# Patient Record
Sex: MALE | Race: WHITE | NOT HISPANIC OR LATINO | Employment: FULL TIME | URBAN - METROPOLITAN AREA
[De-identification: names, ages, dates, MRNs, and addresses within clinical notes are randomized per-mention and may not be internally consistent; named-entity substitution may affect disease eponyms.]

---

## 2017-01-04 ENCOUNTER — ALLSCRIPTS OFFICE VISIT (OUTPATIENT)
Dept: WOUND CARE | Facility: HOSPITAL | Age: 57
End: 2017-01-04
Attending: PODIATRIST
Payer: COMMERCIAL

## 2017-01-04 PROCEDURE — 11042 DBRDMT SUBQ TIS 1ST 20SQCM/<: CPT | Performed by: PODIATRIST

## 2017-01-18 ENCOUNTER — ALLSCRIPTS OFFICE VISIT (OUTPATIENT)
Dept: WOUND CARE | Facility: HOSPITAL | Age: 57
End: 2017-01-18
Attending: PODIATRIST
Payer: COMMERCIAL

## 2017-01-18 PROCEDURE — 99212 OFFICE O/P EST SF 10 MIN: CPT | Performed by: PODIATRIST

## 2017-02-14 ENCOUNTER — GENERIC CONVERSION - ENCOUNTER (OUTPATIENT)
Dept: OTHER | Facility: OTHER | Age: 57
End: 2017-02-14

## 2017-04-10 ENCOUNTER — ALLSCRIPTS OFFICE VISIT (OUTPATIENT)
Dept: OTHER | Facility: OTHER | Age: 57
End: 2017-04-10

## 2017-08-01 ENCOUNTER — GENERIC CONVERSION - ENCOUNTER (OUTPATIENT)
Dept: OTHER | Facility: OTHER | Age: 57
End: 2017-08-01

## 2017-08-15 ENCOUNTER — ALLSCRIPTS OFFICE VISIT (OUTPATIENT)
Dept: OTHER | Facility: OTHER | Age: 57
End: 2017-08-15

## 2017-10-10 DIAGNOSIS — Z94.0 HISTORY OF KIDNEY TRANSPLANT: ICD-10-CM

## 2017-10-10 DIAGNOSIS — N28.9 DISORDER OF KIDNEY AND URETER: ICD-10-CM

## 2017-10-25 ENCOUNTER — ALLSCRIPTS OFFICE VISIT (OUTPATIENT)
Dept: OTHER | Facility: OTHER | Age: 57
End: 2017-10-25

## 2017-10-26 NOTE — PROGRESS NOTES
Assessment  1  Hypertension (401 9) (I10)   2  -donor kidney transplant recipient (V42 0) (Z94 0)   3  Diabetes mellitus (250 00) (E11 9)   4  Immunosuppression (279 9) (D89 9)    Plan  -donor kidney transplant recipient    · (1) CBC/ PLT (NO DIFF); Status:Active; Requested for:2018; Perform:[facility]; [place]; For:-donor kidney transplant recipient; Ordered By:Talon Castro;   · (1) COMPREHENSIVE METABOLIC PANEL; Status:Active; Requested for:2018; Perform:[facility]; [place]; For:-donor kidney transplant recipient; Ordered By:Talon Castro;   · (1)  TACROLIMUS; Status:Active; Requested for:2018; Perform:[facility]; [place]; For:-donor kidney transplant recipient; Ordered By:Talon Castro;   · (Q) URINALYSIS MICROSCOPIC; Status:Active; Requested for:2018; Perform:[facility]; Due:2019;Ordered; For:-donor kidney transplant recipient; Ordered By:Talon Castro;   · Monitor your urine output ; Status:Complete;   Done: 2018   Ordered; For:-donor kidney transplant recipient; Ordered By:Talon Castro;   · We want to put you on the DASH diet for 2000 calories ; Status:Complete;   Done:  2018   Ordered; For:-donor kidney transplant recipient; Ordered By:Talon Castro;   · Weigh yourself every day  We can use this information to help us treat your problem ;  Status:Complete;   Done: 2018   Ordered; For:-donor kidney transplant recipient; Ordered By:Talon Castro;   · Call [phone] if: You have swelling and puffiness of your lower leg or ankles ;  Status:Complete;   Done: 2018   Ordered; For:-donor kidney transplant recipient; Ordered By:Talon Castro;    Discussion/Summary    This is a 49-year-old male with a past medical history of type 1 diabetes mellitus and hypertension status post  donor renal transplant Ã2 who presents to establish care  is doing very well  Renal function remains stable, creatinine is 1 0, fk506 level was 5 3 which is at goal  continue current immunsupression, BP controlled Blood sugars well controlled and CV risk reduction measures  Will continue follow up every 6 months,     [facility]  Reason For Visit  Follow up for kidney transplant      History of Present Illness  This is a 55-year-old male with a past medical history of end-stage kidney disease status post  donor transplant in  with calcineurin inhibitor toxicity and chronic allograft nephropathy with retransplant in  at the [address] [place] [address], type 1 diabetes mellitus, hypertension who presents to establish care  well no complaints  no cp, sob fevers chills  function remains stable DM well controlled now losing weight by diet and mild exercise  continues to get blood work every 2 months  good urine output no blood or foam in urine      Review of Systems    Constitutional: no fever-- and-- no chills  Integumentary: no rashes  Gastrointestinal: no nausea,-- no diarrhea-- and-- no vomiting  Respiratory: no shortness of breath  Cardiovascular: no chest pain-- and-- no lower extremity edema  Musculoskeletal: no joint pain-- and-- no joint swelling  Neurological: no headache,-- no lightheadedness-- and-- no dizziness  Genitourinary: no dysuria  A 12 point review of systems was negative besides what was mentioned above     ROS reviewed  Past Medical History    The active problems and past medical history were reviewed and updated today  Surgical History    The surgical history was reviewed and updated today  Family History    The family history was reviewed and updated today  Social History  The social history was reviewed and updated today  The social history was reviewed and is unchanged  Current Meds   1   Allopurinol 100 MG Oral Tablet; TAKE 1 TABLET DAILY; Therapy: 09NSV5154 to (Evaluate:16Apr2016)  Requested for: 88RRC4881 Recorded   2  HydrALAZINE HCl - 25 MG Oral Tablet; TAKE 1 TABLET 3 TIMES DAILY; Therapy: 20ZOI1496 to Recorded   3  Levothyroxine Sodium 75 MCG Oral Tablet; TAKE 1 TABLET DAILY; Therapy: 00FZW2949 to Recorded   4  Losartan Potassium 25 MG Oral Tablet; TAKE 1 TABLET DAILY; Therapy: 41IDP6979 to Recorded   5  Metoprolol Tartrate 50 MG Oral Tablet; TAKE 1 TABLET TWICE DAILY - HOLD FOR SBP   <110 or hr <60; Therapy: 73QGU0838 to (Evaluate:92Bpd5830)  Requested for: 31OIQ5649 Recorded   6  Multi Vitamin Daily Oral Tablet; TAKE 1 TABLET DAILY; Therapy: 77RNS8059 to Recorded   7  Mupirocin 2 % External Ointment; APPLY AS DIRECTED; Therapy: 51HPX2890 to (Last Rx:31Ikg5338) Ordered   8  Mycophenolate Mofetil 250 MG Oral Capsule; TAKE 2 CAPSULES BY MOUTH TWICE   DAILY; Therapy: 24XJO0970 to Recorded   9  NovoLOG 100 UNIT/ML Subcutaneous Solution; USE AS DIRECTED IN INSULIN PUMP; Therapy: 90VEN0111 to Recorded   10  Pravastatin Sodium 10 MG Oral Tablet; TAKE 1 TABLET AT BEDTIME; Therapy: 04OIX8164 to (Evaluate:16Apr2016)  Requested for: 53KKR3715 Recorded   11  PredniSONE 5 MG Oral Tablet; TAKE 1 TABLET DAILY; Therapy: 53UIK3062 to Recorded   12  ReliOn Confirm Glucose Monitor w/Device Kit; USE AS DIRECTED; Therapy: 09ACT8932 to Recorded   13  Tacrolimus 1 MG Oral Capsule; TAKE 2 CAPSULES IN THE AM AND 1 CAPSULE IN THE    PM;    Therapy: 58KRQ4944 to Recorded   14  Vitamin D 2000 UNIT Oral Capsule; TAKE 1 CAPSULE EVERY DAY; Therapy: 55XCN9864 to Recorded    The medication list was reviewed and updated today  The medication list was reviewed and updated today  Allergies  1   No Known Drug Allergies    Vitals  Vital Signs    Recorded: 77UUE1655 04:27PM   Heart Rate 76   Respiration 16   Systolic 457, RUE, Sitting   Diastolic 82, RUE, Sitting   Height 5 ft 11 in   Weight 206 lb    BMI Calculated 28 73   BSA Calculated 2 13     Physical Exam    Constitutional: General appearance: No acute distress, well appearing and well nourished  ENT: External ears and nose appear normal      Eyes: Anicteric sclerae  Neck: No bruit heard over either carotid  JVD:  No JVD present  Pulmonary: Respiratory effort: No increased work of breathing or signs of respiratory distress  -- Auscultation of lungs: Clear to auscultation  Cardiovascular: Auscultation of heart: Normal rate and rhythm, normal S1 and S2, without murmurs  Abdomen: Non-tender, no masses  -- Fullness on the right and left lower quadrant with no pain  Extremities: No cyanosis, clubbing or edema  Pulses: Dorsalis Pedis and Posterior Tibial pulses normal    Rash: No rash present  Neurologic: Non Focal      Psychiatric: Orientation to person, place, and time: Normal  -- and-- Mood and affect: Normal     Back: No CVA tenderness        Signatures   Electronically signed by : Ghada Vera DO; Oct 25 2017  4:47PM EST                       (Author)

## 2018-01-12 VITALS
HEIGHT: 71 IN | RESPIRATION RATE: 16 BRPM | DIASTOLIC BLOOD PRESSURE: 82 MMHG | HEART RATE: 76 BPM | SYSTOLIC BLOOD PRESSURE: 122 MMHG | WEIGHT: 206 LBS | BODY MASS INDEX: 28.84 KG/M2

## 2018-01-12 VITALS
HEART RATE: 76 BPM | DIASTOLIC BLOOD PRESSURE: 70 MMHG | HEIGHT: 71 IN | TEMPERATURE: 97.2 F | BODY MASS INDEX: 27.3 KG/M2 | WEIGHT: 195 LBS | RESPIRATION RATE: 18 BRPM | SYSTOLIC BLOOD PRESSURE: 120 MMHG

## 2018-01-13 VITALS
HEIGHT: 71 IN | WEIGHT: 214.25 LBS | HEART RATE: 82 BPM | SYSTOLIC BLOOD PRESSURE: 130 MMHG | BODY MASS INDEX: 29.99 KG/M2 | DIASTOLIC BLOOD PRESSURE: 78 MMHG

## 2018-01-15 VITALS
HEIGHT: 71 IN | HEART RATE: 86 BPM | RESPIRATION RATE: 16 BRPM | WEIGHT: 208.38 LBS | BODY MASS INDEX: 29.17 KG/M2 | DIASTOLIC BLOOD PRESSURE: 76 MMHG | SYSTOLIC BLOOD PRESSURE: 128 MMHG | TEMPERATURE: 96.5 F

## 2018-01-15 VITALS
HEIGHT: 71 IN | BODY MASS INDEX: 27.3 KG/M2 | WEIGHT: 195 LBS | RESPIRATION RATE: 17 BRPM | SYSTOLIC BLOOD PRESSURE: 116 MMHG | TEMPERATURE: 96.5 F | DIASTOLIC BLOOD PRESSURE: 60 MMHG

## 2018-01-17 NOTE — MISCELLANEOUS
Message   Recorded as Task   Date: 2016 03:43 PM, Created By: Jomar Abrams   Task Name: Follow Up   Assigned To: [facility]   Regarding Patient: Renato Jordan, Status: In Progress   Comment:    Talon Castro - 2016 3:43 PM     TASK CREATED  Can you let him know that his creatinine is stable and his electolytes are fine  We will see him at his follow up appointment with a repeat BMP, CBC, Phos, Mag and  level  Thanks   Rozina Cabreraee - 2016 3:48 PM     TASK IN PROGRESS   Rozina Cabrera 3:48 PM     TASK EDITED  l/m for patient to return call       Patient is aware of above, he is due to come in in September for an appointment and will call us next week to set up the appointment, he needs to check his work schedule  AG      Active Problems    1  -donor kidney transplant recipient (V42 0) (Z94 0)   2  Diabetes mellitus (250 00) (E11 9)   3  Gout (274 9) (M10 9)   4  Heart disease (429 9) (I51 9)   5  High cholesterol (272 0) (E78 0)   6  Hypertension (401 9) (I10)   7  Immunosuppression (279 9) (D89 9)   8  Kidney problem (593 9) (N28 9)   9  Kidney replaced by transplant (V42 0) (Z94 0)   10  Thyroid disorder (246 9) (E07 9)    Current Meds   1  Allopurinol 100 MG Oral Tablet; TAKE 1 TABLET DAILY; Therapy: 70TXQ0722 to (Evaluate:04Uks7317)  Requested for: 99KYF5030 Recorded   2  CycloSPORINE Modified 25 MG Oral Capsule; Therapy: 46YDE5561 to (Last Rx:31Wvc5359)  Requested for: 69NRI2498 Ordered   3  HydrALAZINE HCl - 25 MG Oral Tablet; TAKE 1 TABLET 3 TIMES DAILY; Therapy: 05HZG7235 to Recorded   4  Lantus SoloStar 100 UNIT/ML Subcutaneous Solution Pen-injector; INJECT 100 UNITS   UNDER SKIN;   Therapy: 48TXI3407 to Recorded   5  Levothyroxine Sodium 75 MCG Oral Tablet; TAKE 1 TABLET DAILY; Therapy: 19EPX9457 to Recorded   6  Lisinopril 20 MG Oral Tablet; Therapy: 37VRL5379 to (Last Rx:15Wea8174)  Requested for: 86Buz5143 Ordered   7  Losartan Potassium 25 MG Oral Tablet; TAKE 1 TABLET DAILY; Therapy: 97YBF8581 to Recorded   8  Metoprolol Tartrate 50 MG Oral Tablet; TAKE 1 TABLET TWICE DAILY; Therapy: 99YIT0906 to (Evaluate:80Avz8342)  Requested for: 37QZO0863 Recorded   9  Multi Vitamin Daily Oral Tablet; TAKE 1 TABLET DAILY; Therapy: 17TFK4604 to Recorded   10  Mycophenolate Mofetil 250 MG Oral Capsule; TAKE 2 CAPSULES BY MOUTH TWICE    DAILY; Therapy: 03UCF4820 to Recorded   11  NovoLOG 100 UNIT/ML Subcutaneous Solution; Therapy: 60IXJ1171 to (Last Rx:36Whx7731)  Requested for: 94XSS1061 Ordered   12  Pravastatin Sodium 10 MG Oral Tablet; TAKE 1 TABLET AT BEDTIME; Therapy: 86KBQ1769 to (Evaluate:16Apr2016)  Requested for: 86YWP2645 Recorded   13  PredniSONE 5 MG Oral Tablet; TAKE 1 TABLET DAILY; Therapy: 32YLM1344 to Recorded   14  ReliOn Confirm Glucose Monitor w/Device Kit; USE AS DIRECTED; Therapy: 30FBO4197 to Recorded   15  Tacrolimus 1 MG Oral Capsule; TAKE 2 CAPSULES IN THE AM AND 1 CAPSULE IN    THE PM;    Therapy: 87HSP4252 to Recorded   16  Vitamin D3 2000 UNIT Oral Capsule; take 1 capsule daily; Therapy: 64AOM9920 to Recorded    Allergies    1   No Known Drug Allergies    Signatures   Electronically signed by : Clayton Henson DO; Jul 11 2016  2:21PM EST                       (Author)

## 2018-01-17 NOTE — MISCELLANEOUS
Message   Recorded as Task   Date: 2016 02:48 PM, Created By: Mel Zambrano   Task Name: Miscellaneous   Assigned To: Mel Zambrano   Regarding Patient: Azalia Moulton, Status: Active   Paresh Bray - 2016 2:48 PM     TASK CREATED  Caller: Self; Other; [phone] (Home); [phone] (Work)  Patient called and said he saw you on 16 and you stated you wanted labs fone once a month, there are no labs generated and I didn't see that mentioned in the chart  Talon Castro - 2016 4:12 PM     TASK REPLIED TO: Previously Assigned To Talon Castro  yes I did  I don't know why they werent in there  Can we get a BMP, CBC, Mag, Phos,  level too  thanks  Labs were faxed to the patient for  and July    AG      Active Problems    1  -donor kidney transplant recipient (V42 0) (Z94 0)   2  Diabetes mellitus (250 00) (E11 9)   3  Gout (274 9) (M10 9)   4  Heart disease (429 9) (I51 9)   5  High cholesterol (272 0) (E78 0)   6  Hypertension (401 9) (I10)   7  Immunosuppression (279 9) (D89 9)   8  Kidney problem (593 9) (N28 9)   9  Kidney replaced by transplant (V42 0) (Z94 0)   10  Thyroid disorder (246 9) (E07 9)    Current Meds   1  Allopurinol 100 MG Oral Tablet; TAKE 1 TABLET DAILY; Therapy: 61XQQ2427 to (Evaluate:15Yac0061)  Requested for: 09ATM4064 Recorded   2  CycloSPORINE Modified 25 MG Oral Capsule; Therapy: 49DWN1162 to (Last Rx:87Fto0576)  Requested for: 51MRC6471 Ordered   3  HydrALAZINE HCl - 25 MG Oral Tablet; TAKE 1 TABLET 3 TIMES DAILY; Therapy: 05CWP0930 to Recorded   4  Lantus SoloStar 100 UNIT/ML Subcutaneous Solution Pen-injector; INJECT 100 UNITS   UNDER SKIN;   Therapy: 08HAG5285 to Recorded   5  Levothyroxine Sodium 75 MCG Oral Tablet; TAKE 1 TABLET DAILY; Therapy: 44VKU8908 to Recorded   6  Lisinopril 20 MG Oral Tablet; Therapy: 44GXM6179 to (Last Rx:44Xxu2748)  Requested for: 62Syv9277 Ordered   7   Losartan Potassium 25 MG Oral Tablet; TAKE 1 TABLET DAILY; Therapy: 09AXX2450 to Recorded   8  Metoprolol Tartrate 50 MG Oral Tablet; TAKE 1 TABLET TWICE DAILY; Therapy: 27ZBC7547 to (Evaluate:16Apr2016)  Requested for: 96LFA1568 Recorded   9  Multi Vitamin Daily Oral Tablet; TAKE 1 TABLET DAILY; Therapy: 79GOH0430 to Recorded   10  Mycophenolate Mofetil 250 MG Oral Capsule; TAKE 2 CAPSULES BY MOUTH TWICE    DAILY; Therapy: 04IOE1269 to Recorded   11  NovoLOG 100 UNIT/ML Subcutaneous Solution; Therapy: 89OIT0328 to (Last Rx:91Jor1548)  Requested for: 20QOR2928 Ordered   12  Pravastatin Sodium 10 MG Oral Tablet; TAKE 1 TABLET AT BEDTIME; Therapy: 90WBC1083 to (Evaluate:16Apr2016)  Requested for: 57MWX5633 Recorded   13  PredniSONE 5 MG Oral Tablet; TAKE 1 TABLET DAILY; Therapy: 25STG5271 to Recorded   14  ReliOn Confirm Glucose Monitor w/Device Kit; USE AS DIRECTED; Therapy: 45WHP4979 to Recorded   15  Tacrolimus 1 MG Oral Capsule; TAKE 2 CAPSULES IN THE AM AND 1 CAPSULE IN    THE PM;    Therapy: 87NIX1092 to Recorded   16  Vitamin D3 2000 UNIT Oral Capsule; take 1 capsule daily; Therapy: 81XQA2079 to Recorded    Allergies    1  No Known Drug Allergies    Plan  Gout    · (1) BASIC METABOLIC PROFILE; Status:Active; Requested SUA:32NZW2370;    · (1) CBC/ PLT (NO DIFF); Status:Active; Requested TMD:40NLM8029;    · (1)  TACROLIMUS; Status:Active; Requested LSD:21EQS6136;    · (1) MAGNESIUM; Status:Active; Requested OPL:40DIE9472;    · (1) PHOSPHORUS; Status:Active; Requested WPA:58MTF2788;   Kidney replaced by transplant    · (1) [place]; Status:Active; Requested for:67Lls7978;    · (1) CBC/ PLT (NO DIFF); Status:Active; Requested for:17Syx2098;    · (1)  TACROLIMUS; Status:Active; Requested for:67Pmy4952;    · (1) MAGNESIUM; Status:Active; Requested for:53Bky1630;    · (1) PHOSPHORUS; Status:Active;  Requested for:87Tmb0315;     Signatures   Electronically signed by : Jovanna Jacobsen DO; Jun 14 2016 4:09PM EST                       (Author)

## 2018-04-01 DIAGNOSIS — Z94.0 HISTORY OF KIDNEY TRANSPLANT: ICD-10-CM

## 2018-05-09 ENCOUNTER — OFFICE VISIT (OUTPATIENT)
Dept: NEPHROLOGY | Facility: HOSPITAL | Age: 58
End: 2018-05-09
Payer: COMMERCIAL

## 2018-05-09 VITALS
WEIGHT: 199 LBS | DIASTOLIC BLOOD PRESSURE: 78 MMHG | HEART RATE: 70 BPM | SYSTOLIC BLOOD PRESSURE: 122 MMHG | HEIGHT: 71 IN | BODY MASS INDEX: 27.86 KG/M2

## 2018-05-09 DIAGNOSIS — M10.00 IDIOPATHIC GOUT, UNSPECIFIED CHRONICITY, UNSPECIFIED SITE: ICD-10-CM

## 2018-05-09 DIAGNOSIS — D84.9 IMMUNOSUPPRESSION (HCC): ICD-10-CM

## 2018-05-09 DIAGNOSIS — Z94.0 RENAL TRANSPLANT RECIPIENT: ICD-10-CM

## 2018-05-09 DIAGNOSIS — E08.00 DIABETES MELLITUS DUE TO UNDERLYING CONDITION WITH HYPEROSMOLARITY WITHOUT COMA, WITHOUT LONG-TERM CURRENT USE OF INSULIN (HCC): ICD-10-CM

## 2018-05-09 DIAGNOSIS — Z94.0 RENAL TRANSPLANT, STATUS POST: Primary | ICD-10-CM

## 2018-05-09 PROCEDURE — 99214 OFFICE O/P EST MOD 30 MIN: CPT | Performed by: INTERNAL MEDICINE

## 2018-05-09 RX ORDER — LEVOTHYROXINE SODIUM 0.07 MG/1
1 TABLET ORAL DAILY
COMMUNITY
Start: 2016-03-17

## 2018-05-09 RX ORDER — PRAVASTATIN SODIUM 10 MG
1 TABLET ORAL
COMMUNITY
Start: 2011-12-05 | End: 2018-10-02 | Stop reason: ALTCHOICE

## 2018-05-09 RX ORDER — ALLOPURINOL 100 MG/1
1 TABLET ORAL DAILY
COMMUNITY
Start: 2011-11-29

## 2018-05-09 RX ORDER — MYCOPHENOLATE MOFETIL 250 MG/1
2 CAPSULE ORAL 2 TIMES DAILY
COMMUNITY
Start: 2016-03-17

## 2018-05-09 RX ORDER — TACROLIMUS 1 MG/1
2 CAPSULE ORAL EVERY 12 HOURS SCHEDULED
COMMUNITY
Start: 2016-03-17

## 2018-05-09 RX ORDER — HYDRALAZINE HYDROCHLORIDE 25 MG/1
1 TABLET, FILM COATED ORAL 3 TIMES DAILY
COMMUNITY
Start: 2016-03-17 | End: 2022-08-09

## 2018-05-09 RX ORDER — ASCORBIC ACID 500 MG
1 TABLET ORAL DAILY
COMMUNITY
Start: 2016-03-17

## 2018-05-09 RX ORDER — PREDNISONE 1 MG/1
1 TABLET ORAL DAILY
COMMUNITY
Start: 2016-03-17

## 2018-05-09 RX ORDER — LOSARTAN POTASSIUM 25 MG/1
1 TABLET ORAL DAILY
COMMUNITY
Start: 2016-03-17

## 2018-05-09 RX ORDER — METOPROLOL TARTRATE 50 MG/1
25 TABLET, FILM COATED ORAL EVERY 12 HOURS SCHEDULED
COMMUNITY
Start: 2011-12-17

## 2018-05-09 NOTE — LETTER
May 9, 4965     [address] [address] [address]    Patient: Corbin Morales   YOB: 1960   Date of Visit: 5/9/2018       Dear Dr Concepcion Wynn: Thank you for referring Corbin Morales to me for evaluation  Below are my notes for this consultation  If you have questions, please do not hesitate to call me  I look forward to following your patient along with you           Sincerely,        Shekhar Hay,         CC: No Recipients

## 2018-05-09 NOTE — PROGRESS NOTES
OFFICE FOLLOW UP - Nephrology   Jim Martinez 62 y o  male MRN: [id number]    Encounter: [id number]          ASSESSMENT and PLAN:  Diagnoses and all orders for this visit:     71-year-old with a past medical history of type 1 diabetes with hypertension status post  donor renal transplant here for follow-up    Renal transplant, status post  - his creatinine remained stable at 0 8 any continues to do well from a transplant standpoint  - follows up every 6 months at the [address] [place] [address] and every 6 months with myself so is seeing a nephrologist every 3 months with repeat blood work every 2 months  - continue cardiovascular risk reduction measures with blood pressure control, diabetic control, lipid control    Immunosuppression ([email or web address] 75 )  - currently on CellCept 500 mg twice daily  - tacrolimus 1 mg 2 times daily last  tacrolimus level was 5 3 in March as a repeat  Level  On Saturday   continue 5 mg of prednisone    Idiopathic gout, unspecified chronicity, unspecified site  - now well controlled on allopurinol 100 mg daily will continue    Diabetes mellitus due to underlying condition with hyperosmolarity without coma, without long-term current use of insulin ([facility] [[email or web address] 75 )  - ongoing glycemic control follows up with endocrinology regularly     hypertension  - blood pressures are well controlled currently on metoprolol 50 mg twice daily, losartan 25 mg daily, hydralazine 25 mg 3 times daily    HPI: Jim Martinez is a 62 y o  male who is here for  Follow-up    Since our last visit, there has been no ER visits or hospitilizations  Patient currently has no complaints at this time and is feeling well  Patient denies any chest pain, shortness of breath and swelling  The last blood work was done on  March, which we have reviewed together      he continues to do well without any difficulties he follows up regularly with his doctors and continues to work, recently went up [email or web address] no problems there as well blood work remains well controlled with a creatinine that is 0 8  He denies any chest pain shortness of breath fevers chills nausea vomiting diarrhea or constipation    ROS:   All the systems were reviewed and were negative except as documented on the HPI  Allergies: Patient has no known allergies  Medications:   Current Outpatient Prescriptions:     allopurinol (ZYLOPRIM) 100 mg tablet, Take 1 tablet by mouth daily, Disp: , Rfl:     Cholecalciferol 2000 units CAPS, Take by mouth, Disp: , Rfl:     hydrALAZINE (APRESOLINE) 25 mg tablet, Take 1 tablet by mouth 3 (three) times a day, Disp: , Rfl:     insulin aspart (NovoLOG) 100 units/mL injection, Inject 100 Units under the skin, Disp: , Rfl:     levothyroxine 75 mcg tablet, Take 1 tablet by mouth daily, Disp: , Rfl:     losartan (COZAAR) 25 mg tablet, Take 1 tablet by mouth daily, Disp: , Rfl:     metoprolol tartrate (LOPRESSOR) 50 mg tablet, Take by mouth, Disp: , Rfl:     Multiple Vitamin (MULTI-DAY) TABS, Take 1 tablet by mouth daily, Disp: , Rfl:     mycophenolate (CELLCEPT) 250 mg capsule, Take 2 capsules by mouth 2 (two) times a day, Disp: , Rfl:     pravastatin (PRAVACHOL) 10 mg tablet, Take 1 tablet by mouth, Disp: , Rfl:     predniSONE 5 mg tablet, Take 1 tablet by mouth daily, Disp: , Rfl:     tacrolimus (PROGRAF) 1 mg capsule, Take by mouth, Disp: , Rfl:     Past Medical History:   Diagnosis Date    Kidney transplanted      Past Surgical History:   Procedure Laterality Date    NEPHRECTOMY TRANSPLANTED ORGAN       Family History   Problem Relation Age of Onset    Autoimmune disease Mother     Hypertension Father     Autoimmune disease Sister       reports that he has quit smoking  He has never used smokeless tobacco  He reports that he does not drink alcohol or use drugs        Physical Exam:   Vitals:    05/09/18 1630   BP: 122/78   BP Location: Right arm   Patient Position: Sitting   Cuff Size: Adult   Pulse: 70   Weight: 90 3 kg (199 lb)   Height: 5' 11" (1 803 m)     Body mass index is 27 75 kg/m²  General: conscious, cooperative, in not acute distress  Eyes: conjunctivae pink, anicteric sclerae  ENT: lips and mucous membranes moist  Neck: supple, no JVD  Chest: clear breath sounds bilateral, no crackles, ronchus or wheezings  CVS: distinct S1 & S2, normal rate, regular rhythm  Abdomen: soft, non-tender, non-distended, normoactive bowel sounds  Extremities: no edema of both legs  Skin: no rash  Neuro: awake, alert, oriented      Lab Results:      Results for orders placed or performed in visit on 12/07/16   Wound culture and Gram stain   Result Value Ref Range    Wound Culture 2+ Growth of Mixed Skin Michelle     Gram Stain Result No polys seen     Gram Stain Result 1+ Gram positive cocci in pairs       creatinine was 0 8 labs checked on Care everywhere tacrolimus level was 5 3   urine protein to creatinine ratio minimal   patient also brought labs in which were reviewed    Portions of the record may have been created with voice recognition software  Occasional wrong word or "sound a like" substitutions may have occurred due to the inherent limitations of voice recognition software  Read the chart carefully and recognize, using context, where substitutions have occurred  If you have any questions, please contact the dictating provider

## 2018-05-09 NOTE — PATIENT INSTRUCTIONS
Low-Sodium Diet   WHAT YOU NEED TO KNOW:   What is a low-sodium diet? A low-sodium diet limits foods that are high in sodium (salt)  You will need to follow a low-sodium diet if you have high blood pressure, kidney disease, or heart failure  You may also need to follow this diet if you have a condition that is causing your body to retain (hold) extra fluid  You may need to limit the amount of sodium you eat to 1,500 mg  Ask your healthcare provider how much sodium you can have each day  How can I use food labels to choose foods that are low in sodium? Read food labels to find the amount of sodium they contain  The amount of sodium is listed in milligrams (mg)  The % Daily Value (DV) column tells you how much of your daily needs are met by 1 serving of the food for each nutrient listed  Choose foods that have less than 5% of the DV of sodium  These foods are considered low in sodium  Foods that have 20% or more of the DV of sodium are considered high in sodium  Some food labels may also list any of the following terms that tell you about the sodium content in the food:  · Sodium-free:  Less than 5 mg in each serving    · Very low sodium:  35 mg of sodium or less in each serving    · Low sodium:  140 mg of sodium or less in each serving    · Reduced sodium: At least 25% less sodium in each serving than the regular type    · Light in sodium:  50% less sodium in each serving    · Unsalted or no added salt:  No extra salt is added during processing (the food may still contain sodium)  Which foods should I avoid? Salty foods are high in sodium   You should avoid the following:  · Processed foods:      ¨ Mixes for cornbread, biscuits, cake, and pudding     ¨ Instant foods, such as potatoes, cereals, noodles, and rice     ¨ Packaged foods, such as bread stuffing, rice and pasta mixes, snack dip mixes, and macaroni and cheese     ¨ Canned foods, such as canned vegetables, soups, broths, sauces, and vegetable or tomato juice    ¨ Snack foods, such as salted chips, popcorn, pretzels, pork rinds, salted crackers, and salted nuts    ¨ Frozen foods, such as dinners, entrees, vegetables with sauces, and breaded meats    ¨ Sauerkraut, pickled vegetables, and other foods prepared in brine    · Meats and cheeses:      ¨ Smoked or cured meat, such as corned beef, salmeron, ham, hot dogs, and sausage    ¨ Canned meats or spreads, such as potted meats, sardines, anchovies, and imitation seafood    ¨ Deli or lunch meats, such as bologna, ham, turkey, and roast beef    ¨ Processed cheese, such as American cheese and cheese spreads    · Condiments, sauces, and seasonings:      ¨ Salt (¼ teaspoon of salt contains 575 mg of sodium)    ¨ Seasonings made with salt, such as garlic salt, celery salt, onion salt, and seasoned salt    ¨ Regular soy sauce, barbecue sauce, teriyaki sauce, steak sauce, Worcestershire sauce, and most flavored vinegars    ¨ Canned gravy and mixes     ¨ Regular condiments, such as mustard, ketchup, and salad dressings    ¨ Pickles and olives    ¨ Meat tenderizers and monosodium glutamate (MSG)  Which foods can I include? Read the food label to find the amount of sodium in each serving  · Bread and cereal:  Try to choose breads with less than 80 mg of sodium per serving  ¨ Bread, roll, gloria, tortilla, or unsalted crackers  ¨ Ready-to-eat cereals with less than 5% DV of sodium (examples include shredded wheat and puffed rice)    ¨ Pasta    · Vegetables and fruits:      ¨ Unsalted fresh, frozen, or canned vegetables    ¨ Fresh, frozen, or canned fruits    ¨ Fruit juice    · Dairy:  One serving has about 150 mg of sodium  ¨ Milk, all types    ¨ Yogurt    ¨ Hard cheese, such as cheddar, Swiss, [employer], or mozzarella    · Meat and other protein foods:  Some raw meats may have added sodium       ¨ Plain meats, fish, and poultry     ¨ Egg    · Other foods:      ¨ Homemade pudding    ¨ Unsalted nuts, popcorn, or pretzels    ¨ Unsalted butter or margarine  What are some ways that I can decrease sodium? · Add spices and herbs to foods instead of salt during cooking  Use salt-free seasonings to add flavor to foods  Examples include onion powder, garlic powder, basil, roberto powder, paprika, and parsley  Try lemon or lime juice or vinegar to give foods a tart flavor  Use hot peppers, pepper, or cayenne pepper to add a spicy flavor to foods  · Do not keep a salt shaker at your kitchen table  This may help keep you from adding salt to food at the table  It may take time to get used to enjoying the natural flavor of food instead of adding salt  Talk to your healthcare provider before you use salt substitutes  Some salt substitutes have a high amount of potassium and need to be avoided if you have kidney disease  · Choose low-sodium foods at restaurants  Meals from restaurants are often high in sodium  Some restaurants have nutrition information on the menu that tells you the amount of sodium in their foods  If possible, ask for your food to be prepared with less, or no salt  · Shop for unsalted or low-sodium foods and snacks at the grocery store  Examples include unsalted or low-sodium broths, soups, and canned vegetables  Choose fresh or frozen vegetables instead  Choose unsalted nuts or seeds or fresh fruits or vegetables as snacks  Read food labels and choose salt-free, very low-sodium, or low-sodium foods  CARE AGREEMENT:   You have the right to help plan your care  Discuss treatment options with your caregivers to decide what care you want to receive  You always have the right to refuse treatment  The above information is an  only  It is not intended as medical advice for individual conditions or treatments  Talk to your doctor, nurse or pharmacist before following any medical regimen to see if it is safe and effective for you    © 2017 [employer] Information is for End User's use only and may not be sold, redistributed or otherwise used for commercial purposes  All illustrations and images included in CareNotes® are the copyrighted property of A D A M [employer] Inc  or Jacob Jarrell

## 2018-08-06 LAB
LEFT EYE DIABETIC RETINOPATHY: NORMAL
RIGHT EYE DIABETIC RETINOPATHY: NORMAL

## 2018-09-15 LAB — HBA1C MFR BLD HPLC: 5.7 %

## 2018-10-02 ENCOUNTER — OFFICE VISIT (OUTPATIENT)
Dept: CARDIOLOGY CLINIC | Facility: CLINIC | Age: 58
End: 2018-10-02
Payer: COMMERCIAL

## 2018-10-02 VITALS
HEIGHT: 71 IN | HEART RATE: 65 BPM | WEIGHT: 194 LBS | BODY MASS INDEX: 27.16 KG/M2 | SYSTOLIC BLOOD PRESSURE: 120 MMHG | DIASTOLIC BLOOD PRESSURE: 82 MMHG

## 2018-10-02 DIAGNOSIS — E08.00 DIABETES MELLITUS DUE TO UNDERLYING CONDITION WITH HYPEROSMOLARITY WITHOUT COMA, WITHOUT LONG-TERM CURRENT USE OF INSULIN (HCC): ICD-10-CM

## 2018-10-02 DIAGNOSIS — Z71.9 ENCOUNTER FOR CONSULTATION: Primary | ICD-10-CM

## 2018-10-02 DIAGNOSIS — I10 HYPERTENSION, UNSPECIFIED TYPE: ICD-10-CM

## 2018-10-02 DIAGNOSIS — I65.29 STENOSIS OF CAROTID ARTERY, UNSPECIFIED LATERALITY: ICD-10-CM

## 2018-10-02 PROBLEM — R94.31 ABNORMAL EKG: Status: ACTIVE | Noted: 2018-10-02

## 2018-10-02 PROCEDURE — 99243 OFF/OP CNSLTJ NEW/EST LOW 30: CPT | Performed by: INTERNAL MEDICINE

## 2018-10-02 PROCEDURE — 93000 ELECTROCARDIOGRAM COMPLETE: CPT | Performed by: INTERNAL MEDICINE

## 2018-10-02 RX ORDER — ROSUVASTATIN CALCIUM 10 MG/1
10 TABLET, COATED ORAL DAILY
Qty: 30 TABLET | Refills: 3 | Status: SHIPPED | OUTPATIENT
Start: 2018-10-02 | End: 2018-10-02 | Stop reason: SDUPTHER

## 2018-10-02 RX ORDER — ROSUVASTATIN CALCIUM 10 MG/1
10 TABLET, COATED ORAL DAILY
Qty: 30 TABLET | Refills: 0 | Status: SHIPPED | OUTPATIENT
Start: 2018-10-02 | End: 2018-11-05 | Stop reason: SDUPTHER

## 2018-10-02 NOTE — PATIENT INSTRUCTIONS
Please complete recommended testing    Please discontinue your pravastatin and begin rosuvastatin instead

## 2018-10-04 NOTE — PROGRESS NOTES
H&P Exam - Cardiology   Gilberto Mattson 62 y o  male MRN: [id number]  Unit/Bed#:  Encounter: [id number]    Assessment/Plan     AssessmentPlan:    Hypertension:  - blood pressure well controlled on current antihypertensive regimen started by his nephrologist, continue with this  History of renal transplant  On immunosuppressive therapy  Preventative cardiology:    - on pravastatin  - has a history of peripheral vascular disease and carotid disease,  - diabetes is well controlled with insulin pump  - cannot take aspirin for primary prophylaxis as he has history of retinal bleeding   - will switch pravastatin to rosuvastatin, goal LDL is less than 70   - patient counseled on lifestyle modification  - check echocardiogram  - check carotid artery Dopplers    Diabetes type 1:  HB A1c well controlled          History of Present Illness   HPI:  Gilberto Mattson is a 62 y o  male  He has a history of kidney transplant 28 years ago, and it repeat transplant in 2013  He has no complaints at this time and is here to establish cardiovascular care  He has a history of peripheral vascular disease and carotid disease on previous testing  He has a history of hypertension and diabetes type 1  His blood pressure and his blood sugars have been well controlled  He is on chronic immunosuppressive therapy with prednisone CellCept, and tacrolimus  He has a history of rectal bleeding in the 1980s and 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  He follows up regularly with a nephrologist here and at [facility]  He has not had an echocardiogram since before his surgery  Over the last couple of years he has lost about 30 lb of weight voluntarily through diet control and exercise  His EKG today was unremarkable      He is a never smoker and is able to do moderate to heavy intensity exercise at home  He also has a job which involves a lot of heavy lifting  He denies chest pain, shortness of breath, PND, orthopnea, syncope, palpitations          Historical Information   Past Medical History:   Diagnosis Date    Kidney transplanted      Past Surgical History:   Procedure Laterality Date    NEPHRECTOMY TRANSPLANTED ORGAN       Social History   History   Alcohol Use No     History   Drug Use No     History   Smoking Status    Former Smoker   Smokeless Tobacco    Never Used     Family History:   Family History   Problem Relation Age of Onset    Autoimmune disease Mother     Hypertension Father     Autoimmune disease Sister        Meds/Allergies   all medications and allergies reviewed  No Known Allergies    Objective   Vitals: Blood pressure 120/82, pulse 65, height 5' 11" (1 803 m), weight 88 kg (194 lb)  [unfilled]    Invasive Devices          No matching active lines, drains, or airways          Review of Systems:  Review of Systems   All other systems reviewed and are negative  General:  AO x3, no acute distress  Cardiac:  S1-S2 normal  No murmurs, rubs or gallops, JVP:  Not seen  Lungs:  Clear to auscultation bilaterally, no wheezing or crackles  Abdomen:  Soft nontender nondistended, positive bowel sounds  Extremities:  Warm, well perfused, pulses palpable, no ulcers or rashes  Neuro: Grossly nonfocal        Physical Exam    Lab Results: I have personally reviewed pertinent lab results  Imaging: I have personally reviewed pertinent reports  Arnie Benjamin MD  Cardiology Fellow    ** Please Note: Fluency DirectDictation voice to text software may have been used in the creation of this document   **

## 2018-11-05 DIAGNOSIS — E08.00 DIABETES MELLITUS DUE TO UNDERLYING CONDITION WITH HYPEROSMOLARITY WITHOUT COMA, WITHOUT LONG-TERM CURRENT USE OF INSULIN (HCC): ICD-10-CM

## 2018-11-05 RX ORDER — ROSUVASTATIN CALCIUM 10 MG/1
10 TABLET, COATED ORAL DAILY
Qty: 30 TABLET | Refills: 2 | Status: SHIPPED | OUTPATIENT
Start: 2018-11-05 | End: 2018-11-28 | Stop reason: SDUPTHER

## 2018-11-09 ENCOUNTER — HOSPITAL ENCOUNTER (OUTPATIENT)
Dept: NON INVASIVE DIAGNOSTICS | Facility: CLINIC | Age: 58
Discharge: HOME | End: 2018-11-09
Payer: COMMERCIAL

## 2018-11-09 DIAGNOSIS — I65.29 STENOSIS OF CAROTID ARTERY, UNSPECIFIED LATERALITY: ICD-10-CM

## 2018-11-09 DIAGNOSIS — E08.00 DIABETES MELLITUS DUE TO UNDERLYING CONDITION WITH HYPEROSMOLARITY WITHOUT COMA, WITHOUT LONG-TERM CURRENT USE OF INSULIN (HCC): ICD-10-CM

## 2018-11-09 DIAGNOSIS — I10 HYPERTENSION, UNSPECIFIED TYPE: ICD-10-CM

## 2018-11-09 PROCEDURE — 93880 EXTRACRANIAL BILAT STUDY: CPT | Performed by: SURGERY

## 2018-11-09 PROCEDURE — 93880 EXTRACRANIAL BILAT STUDY: CPT

## 2018-11-21 ENCOUNTER — APPOINTMENT (OUTPATIENT)
Dept: LAB | Facility: HOSPITAL | Age: 58
End: 2018-11-21
Payer: COMMERCIAL

## 2018-11-21 ENCOUNTER — TRANSCRIBE ORDERS (OUTPATIENT)
Dept: LAB | Facility: HOSPITAL | Age: 58
End: 2018-11-21

## 2018-11-21 DIAGNOSIS — Z94.0 KIDNEY REPLACED BY TRANSPLANT: ICD-10-CM

## 2018-11-21 DIAGNOSIS — Z94.0 KIDNEY REPLACED BY TRANSPLANT: Primary | ICD-10-CM

## 2018-11-21 LAB
ANION GAP SERPL CALCULATED.3IONS-SCNC: 8 MMOL/L (ref 4–13)
BUN SERPL-MCNC: 12 MG/DL (ref 5–25)
CALCIUM SERPL-MCNC: 9.7 MG/DL (ref 8.3–10.1)
CHLORIDE SERPL-SCNC: 102 MMOL/L (ref 100–108)
CO2 SERPL-SCNC: 22 MMOL/L (ref 21–32)
CREAT SERPL-MCNC: 1.09 MG/DL (ref 0.6–1.3)
GFR SERPL CREATININE-BSD FRML MDRD: 74 ML/MIN/1.73SQ M
GLUCOSE SERPL-MCNC: 74 MG/DL (ref 65–140)
POTASSIUM SERPL-SCNC: 4.1 MMOL/L (ref 3.5–5.3)
SODIUM SERPL-SCNC: 132 MMOL/L (ref 136–145)

## 2018-11-21 PROCEDURE — 80048 BASIC METABOLIC PNL TOTAL CA: CPT

## 2018-11-21 PROCEDURE — 36415 COLL VENOUS BLD VENIPUNCTURE: CPT

## 2018-11-28 DIAGNOSIS — E08.00 DIABETES MELLITUS DUE TO UNDERLYING CONDITION WITH HYPEROSMOLARITY WITHOUT COMA, WITHOUT LONG-TERM CURRENT USE OF INSULIN (HCC): ICD-10-CM

## 2018-11-28 RX ORDER — ROSUVASTATIN CALCIUM 10 MG/1
10 TABLET, COATED ORAL DAILY
Qty: 90 TABLET | Refills: 3 | Status: SHIPPED | OUTPATIENT
Start: 2018-11-28 | End: 2019-10-29

## 2018-11-29 ENCOUNTER — HOSPITAL ENCOUNTER (OUTPATIENT)
Dept: NON INVASIVE DIAGNOSTICS | Facility: CLINIC | Age: 58
Discharge: HOME | End: 2018-11-29
Payer: COMMERCIAL

## 2018-11-29 DIAGNOSIS — I10 HYPERTENSION, UNSPECIFIED TYPE: ICD-10-CM

## 2018-11-29 PROCEDURE — 93306 TTE W/DOPPLER COMPLETE: CPT | Performed by: INTERNAL MEDICINE

## 2018-11-29 PROCEDURE — 93306 TTE W/DOPPLER COMPLETE: CPT

## 2018-12-31 ENCOUNTER — OFFICE VISIT (OUTPATIENT)
Dept: NEPHROLOGY | Facility: CLINIC | Age: 58
End: 2018-12-31
Payer: COMMERCIAL

## 2018-12-31 VITALS
DIASTOLIC BLOOD PRESSURE: 61 MMHG | SYSTOLIC BLOOD PRESSURE: 143 MMHG | WEIGHT: 194.8 LBS | HEART RATE: 64 BPM | HEIGHT: 71 IN | BODY MASS INDEX: 27.27 KG/M2

## 2018-12-31 DIAGNOSIS — I10 ESSENTIAL HYPERTENSION: ICD-10-CM

## 2018-12-31 DIAGNOSIS — Z94.0 RENAL TRANSPLANT RECIPIENT: Primary | ICD-10-CM

## 2018-12-31 DIAGNOSIS — D84.9 IMMUNOSUPPRESSION (HCC): ICD-10-CM

## 2018-12-31 DIAGNOSIS — M10.00 IDIOPATHIC GOUT, UNSPECIFIED CHRONICITY, UNSPECIFIED SITE: ICD-10-CM

## 2018-12-31 PROCEDURE — 99214 OFFICE O/P EST MOD 30 MIN: CPT | Performed by: INTERNAL MEDICINE

## 2018-12-31 NOTE — PATIENT INSTRUCTIONS
Chronic Kidney Disease   WHAT YOU NEED TO KNOW:   Chronic kidney disease (CKD) is the gradual and permanent loss of kidney function  It is also called chronic kidney failure, or chronic renal insufficiency  Normally, the kidneys remove fluid, chemicals, and waste from your blood  These wastes are turned into urine by your kidneys  CKD may worsen over time and lead to kidney failure  DISCHARGE INSTRUCTIONS:   Return to the emergency department if:   · You are confused and very drowsy  · You have a seizure  · You have shortness of breath  Contact your healthcare provider if:   · You suddenly gain or lose more weight than your healthcare provider has told you is okay  · You have itchy skin or a rash  · You urinate more or less than you normally do  · You have blood in your urine  · You have nausea and repeated vomiting  · You have fatigue or muscle weakness  · You have hiccups that will not stop  · You have questions or concerns about your condition or care  Medicines:   · Medicines  may be given to decrease blood pressure and get rid of extra fluid  You may also receive medicine to manage health conditions that may occur with CKD, such as anemia, diabetes, and heart disease  · Take your medicine as directed  Contact your healthcare provider if you think your medicine is not helping or if you have side effects  Tell him or her if you are allergic to any medicine  Keep a list of the medicines, vitamins, and herbs you take  Include the amounts, and when and why you take them  Bring the list or the pill bottles to follow-up visits  Carry your medicine list with you in case of an emergency  Follow up with your healthcare provider as directed: You will need to return for tests to monitor your kidney function  You may also be referred to a kidney specialist  Write down your questions so you remember to ask them during your visits  Manage other health conditions:   Follow your healthcare provider's directions on how to manage diabetes, high blood pressure, and heart disease  These conditions can make CKD worse  Talk to your healthcare provider before you take over-the-counter medicine  Medicines such as NSAIDs, stomach medicine, or laxatives may harm your kidneys  Weigh yourself daily:  Ask your healthcare provider what your weight should be  Ask how much liquid you should drink each day  CKD may cause you to gain or lose weight rapidly  Weigh yourself every day  Write down your weight, how much liquid you drink or eat, and how much you urinate each day  Contact your healthcare provider if your weight is higher or lower than it should be  Manage CKD:   · Maintain a healthy weight  Ask your healthcare provider how much you should weigh  Ask him to help you create a weight loss plan if you are overweight  · Exercise 30 to 60 minutes a day, 4 to 7 times a week, or as directed  Ask about the best exercise plan for you  Regular exercise can help you manage CKD, high blood pressure, and diabetes  · Follow your healthcare provider's advice about what to eat and drink  He may tell you to eat food low in sodium (salt), potassium, phosphorus, or protein  You may need to see a dietitian if you need help planning meals  Ask how much liquid to drink each day and which liquids are best for you  · Limit alcohol  Ask how much alcohol is safe for you to drink  A drink of alcohol is 12 ounces of beer, 5 ounces of wine, or 1½ ounces of liquor  · Do not smoke  Nicotine and other chemicals in cigarettes and cigars can cause lung and kidney damage  Ask your healthcare provider for information if you currently smoke and need help to quit  E-cigarettes or smokeless tobacco still contain nicotine  Talk to your healthcare provider before you use these products  · Ask your healthcare provider if you need vaccines    Infections such as pneumonia, influenza, and hepatitis can be more harmful or more likely to occur in a person who has CKD  Vaccines reduce your risk of infection with these viruses  © 2017 [facility] Information is for End User's use only and may not be sold, redistributed or otherwise used for commercial purposes  All illustrations and images included in CareNotes® are the copyrighted property of A D A M [employer] Inc  or Jacob Jarrell  The above information is an  only  It is not intended as medical advice for individual conditions or treatments  Talk to your doctor, nurse or pharmacist before following any medical regimen to see if it is safe and effective for you

## 2018-12-31 NOTE — LETTER
December 31, 8032     Paras Bustamante [address] [address]    Patient: Vincent Brito   YOB: 1960   Date of Visit: 12/31/2018       Dear Dr Yordan Bustamante: Thank you for referring Vincent Brito to me for evaluation  Below are my notes for this consultation  If you have questions, please do not hesitate to call me  I look forward to following your patient along with you           Sincerely,        Benjamin Perales, DO        CC: No Recipients

## 2018-12-31 NOTE — PROGRESS NOTES
OFFICE FOLLOW UP - Nephrology   Susan Arguelles 62 y o  male MRN: [id number]    Encounter: [id number]          ASSESSMENT and PLAN:  Diagnoses and all orders for this visit:    51-year-old with a past medical history of type 1 diabetes with hypertension status post  donor renal transplant here for follow-up    Renal transplant, status post  - his creatinine remained stable at between 0 8 in 1 2 any continues to do well from a transplant standpoint  - follows up every 6 months at the [address] [place] [address] and every 6 months with myself so is seeing a nephrologist every 3 months with repeat blood work every 2 months  - continue cardiovascular risk reduction measures with blood pressure control, diabetic control, lipid control    Immunosuppression ([facility] [address] )  - currently on CellCept 500 mg twice daily  - tacrolimus 1 mg 2 times daily last  tacrolimus level was 7 6 in March as a repeat  Level  On Saturday   continue 5 mg of prednisone  -discussed with his transplant center and okay to target a slightly higher tacrolimus level    Idiopathic gout, unspecified chronicity, unspecified site  - now well controlled on allopurinol 100 mg daily will continue    Diabetes mellitus due to underlying condition with hyperosmolarity without coma, without long-term current use of insulin [facility]  - ongoing glycemic control follows up with endocrinology regularly     hypertension  -hydralazine 25 mg 3 times daily, losartan 25 mg daily, metoprolol 50 mg twice daily  -blood pressures at home average 120/60 in the office today blood pressures are higher have asked him to continue checking his blood pressures at home-may need to increase his losartan to 25 mg b i d   -echocardiogram reviewed does have concentric hypertrophy and grade 2 diastolic dysfunction    Hyponatremia  -this is likely related to hyperglycemia and over consumption of free water  -targeting about 1 5-2 L serum sodium did improve to 135-136    -continues to do well continue ongoing follow-up every 6 months with blood work every 2 months     HPI: Megan Beauchamp is a 62 y o  male who is here for  Follow-up    Since our last visit, there has been no ER visits or hospitilizations  Patient currently has no complaints at this time and is feeling well  Patient denies any chest pain, shortness of breath and swelling  The last blood work was done in December    He is doing well denies any new complaints has lost about 5 lb denies any chest pain or shortness of Breath currently on 3 drug regimen for immunosuppression tolerating this well he has no current complaints no chest pain no fevers chills nausea vomiting diarrhea or constipation blood pressure is slightly elevated in the office today but states that his home blood pressures are better good urine output no foamy urine or any bloody    ROS:   All the systems were reviewed and were negative except as documented on the HPI  Allergies: Patient has no known allergies      Medications:   Current Outpatient Prescriptions:     allopurinol (ZYLOPRIM) 100 mg tablet, Take 1 tablet by mouth daily, Disp: , Rfl:     Cholecalciferol 2000 units CAPS, Take 2,000 Units by mouth daily  , Disp: , Rfl:     hydrALAZINE (APRESOLINE) 25 mg tablet, Take 1 tablet by mouth 3 (three) times a day, Disp: , Rfl:     insulin aspart (NovoLOG) 100 units/mL injection, Inject 100 Units under the skin, Disp: , Rfl:     levothyroxine 75 mcg tablet, Take 1 tablet by mouth daily, Disp: , Rfl:     losartan (COZAAR) 25 mg tablet, Take 1 tablet by mouth daily, Disp: , Rfl:     metoprolol tartrate (LOPRESSOR) 50 mg tablet, Take 50 mg by mouth every 12 (twelve) hours  , Disp: , Rfl:     Multiple Vitamin (MULTI-DAY) TABS, Take 1 tablet by mouth daily, Disp: , Rfl:     mycophenolate (CELLCEPT) 250 mg capsule, Take 2 capsules by mouth 2 (two) times a day, Disp: , Rfl:     predniSONE 5 mg tablet, Take 1 tablet by mouth daily, Disp: , Rfl:     rosuvastatin (CRESTOR) 10 MG tablet, Take 1 tablet (10 mg total) by mouth daily, Disp: 90 tablet, Rfl: 3    tacrolimus (PROGRAF) 1 mg capsule, Take 2 mg by mouth every 12 (twelve) hours  , Disp: , Rfl:     Past Medical History:   Diagnosis Date    Kidney transplanted      Past Surgical History:   Procedure Laterality Date    NEPHRECTOMY TRANSPLANTED ORGAN       Family History   Problem Relation Age of Onset    Autoimmune disease Mother     Hypertension Father     Autoimmune disease Sister       reports that he has quit smoking  He has never used smokeless tobacco  He reports that he does not drink alcohol or use drugs  Physical Exam:   Vitals:    12/31/18 1416 12/31/18 1419 12/31/18 1421 12/31/18 1423   BP:  144/60 147/63 143/61   BP Location:  Right arm Right arm Right arm   Patient Position:  Sitting Sitting Sitting   Cuff Size:  Large Large Large   Pulse:  63 65 64   Weight: 88 4 kg (194 lb 12 8 oz)      Height: 5' 11" (1 803 m)        Body mass index is 27 17 kg/m²      General: conscious, cooperative, in not acute distress  Eyes: conjunctivae pink, anicteric sclerae  ENT: lips and mucous membranes moist  Neck: supple, no JVD  Chest: clear breath sounds bilateral, no crackles, ronchus or wheezings  CVS: distinct S1 & S2, normal rate, regular rhythm  Abdomen: soft, non-tender, non-distended, normoactive bowel sounds  Extremities: no edema of both legs  Skin: no rash  Neuro: awake, alert, oriented      Lab Results:        Invalid input(s): ALBUMIN  Results for orders placed or performed in visit on 36/17/03   Basic metabolic panel   Result Value Ref Range    Sodium 132 (L) 136 - 145 mmol/L    Potassium 4 1 3 5 - 5 3 mmol/L    Chloride 102 100 - 108 mmol/L    CO2 22 21 - 32 mmol/L    ANION GAP 8 4 - 13 mmol/L    BUN 12 5 - 25 mg/dL    Creatinine 1 09 0 60 - 1 30 mg/dL    Glucose 74 65 - 140 mg/dL    Calcium 9 7 8 3 - 10 1 mg/dL    eGFR 74 ml/min/1 73sq m     Labs reviewed in Care everywhere creatinine stable electrolytes stable  Portions of the record may have been created with voice recognition software  Occasional wrong word or "sound a like" substitutions may have occurred due to the inherent limitations of voice recognition software  Read the chart carefully and recognize, using context, where substitutions have occurred  If you have any questions, please contact the dictating provider

## 2019-04-30 ENCOUNTER — TELEPHONE (OUTPATIENT)
Dept: NEPHROLOGY | Facility: CLINIC | Age: 59
End: 2019-04-30

## 2019-05-30 ENCOUNTER — TELEPHONE (OUTPATIENT)
Dept: NEPHROLOGY | Facility: CLINIC | Age: 59
End: 2019-05-30

## 2019-05-31 ENCOUNTER — OFFICE VISIT (OUTPATIENT)
Dept: NEPHROLOGY | Facility: CLINIC | Age: 59
End: 2019-05-31
Payer: COMMERCIAL

## 2019-05-31 VITALS
SYSTOLIC BLOOD PRESSURE: 118 MMHG | HEART RATE: 53 BPM | HEIGHT: 71 IN | BODY MASS INDEX: 25.62 KG/M2 | DIASTOLIC BLOOD PRESSURE: 62 MMHG | WEIGHT: 183 LBS

## 2019-05-31 DIAGNOSIS — E08.00 DIABETES MELLITUS DUE TO UNDERLYING CONDITION WITH HYPEROSMOLARITY WITHOUT COMA, WITHOUT LONG-TERM CURRENT USE OF INSULIN (HCC): ICD-10-CM

## 2019-05-31 DIAGNOSIS — Z94.0 RENAL TRANSPLANT RECIPIENT: Primary | ICD-10-CM

## 2019-05-31 DIAGNOSIS — I10 ESSENTIAL HYPERTENSION: ICD-10-CM

## 2019-05-31 DIAGNOSIS — M10.00 IDIOPATHIC GOUT, UNSPECIFIED CHRONICITY, UNSPECIFIED SITE: ICD-10-CM

## 2019-05-31 DIAGNOSIS — D84.9 IMMUNOSUPPRESSION (HCC): ICD-10-CM

## 2019-05-31 PROCEDURE — 3066F NEPHROPATHY DOC TX: CPT | Performed by: INTERNAL MEDICINE

## 2019-05-31 PROCEDURE — 99214 OFFICE O/P EST MOD 30 MIN: CPT | Performed by: INTERNAL MEDICINE

## 2019-09-09 ENCOUNTER — TELEPHONE (OUTPATIENT)
Dept: NEPHROLOGY | Facility: CLINIC | Age: 59
End: 2019-09-09

## 2019-10-14 LAB
LEFT EYE DIABETIC RETINOPATHY: NORMAL
RIGHT EYE DIABETIC RETINOPATHY: NORMAL

## 2019-10-28 RX ORDER — BLOOD SUGAR DIAGNOSTIC
STRIP MISCELLANEOUS
Refills: 4 | COMMUNITY
Start: 2019-09-10

## 2019-10-29 ENCOUNTER — OFFICE VISIT (OUTPATIENT)
Dept: CARDIOLOGY CLINIC | Facility: CLINIC | Age: 59
End: 2019-10-29
Payer: COMMERCIAL

## 2019-10-29 VITALS
DIASTOLIC BLOOD PRESSURE: 64 MMHG | HEIGHT: 71 IN | HEART RATE: 54 BPM | WEIGHT: 179.4 LBS | BODY MASS INDEX: 25.11 KG/M2 | SYSTOLIC BLOOD PRESSURE: 124 MMHG

## 2019-10-29 DIAGNOSIS — I10 HYPERTENSION, UNSPECIFIED TYPE: Primary | ICD-10-CM

## 2019-10-29 DIAGNOSIS — E08.00 DIABETES MELLITUS DUE TO UNDERLYING CONDITION WITH HYPEROSMOLARITY WITHOUT COMA, WITHOUT LONG-TERM CURRENT USE OF INSULIN (HCC): ICD-10-CM

## 2019-10-29 PROCEDURE — 99215 OFFICE O/P EST HI 40 MIN: CPT | Performed by: INTERNAL MEDICINE

## 2019-10-29 PROCEDURE — 93000 ELECTROCARDIOGRAM COMPLETE: CPT | Performed by: INTERNAL MEDICINE

## 2019-10-29 RX ORDER — ROSUVASTATIN CALCIUM 10 MG/1
10 TABLET, COATED ORAL DAILY
Qty: 90 TABLET | Refills: 3 | Status: SHIPPED | OUTPATIENT
Start: 2019-10-29 | End: 2020-10-13 | Stop reason: SDUPTHER

## 2019-10-29 RX ORDER — ROSUVASTATIN CALCIUM 10 MG/1
10 TABLET, COATED ORAL DAILY
COMMUNITY
End: 2019-10-29 | Stop reason: SDUPTHER

## 2019-10-29 RX ORDER — ROSUVASTATIN CALCIUM 10 MG/1
10 TABLET, COATED ORAL DAILY
Qty: 90 TABLET | Refills: 3 | Status: CANCELLED | OUTPATIENT
Start: 2019-10-29

## 2019-10-29 NOTE — LETTER
October 29, [address]  [address] [facility] [address]    Patient: Anastacio Esteban   YOB: 1960   Date of Visit: 10/29/2019       Dear Dr Emeli Perez: Thank you for referring Floyd Euceda to me for evaluation  Below are my notes for this consultation  If you have questions, please do not hesitate to call me  I look forward to following your patient along with you  Sincerely,        Timothy Boyle MD        CC: DO Timothy Barajas MD  10/29/2019  1:43 PM  Sign at close encounter  H&P Exam - Cardiology   Anastacio Esteban 61 y o  male MRN: [id number]  Unit/Bed#:  Encounter: [id number]    Assessment/Plan       Hypertension:  - blood pressure well controlled on current antihypertensive regimen started by his nephrologist, continue with this  History of renal transplant  On immunosuppressive therapy  Preventative cardiology:    - has a history of peripheral vascular disease and carotid disease,  - diabetes is well controlled with insulin pump  - cannot take aspirin for primary prophylaxis as he has history of retinal bleeding   - will switch pravastatin to rosuvastatin, goal LDL is less than 70   - patient counseled on lifestyle modification    Diabetes type 1:  HB A1c well controlled    Heart murmur: No new valvular abnormalities on echo, he has a long standing hx of this murmur      10/29/19: No complaints  Has been doing well, lipids are well controlled, LDL down to 56  History of Present Illness   HPI:  Anastacio Esteban is a 61 y o  male  He has a history of kidney transplant 28 years ago, and it repeat transplant in 2013  He has no complaints at this time and is here to establish cardiovascular care  He has a history of peripheral vascular disease and carotid disease on previous testing  He has a history of hypertension and diabetes type 1  His blood pressure and his blood sugars have been well controlled    He is on chronic immunosuppressive therapy with prednisone CellCept, and tacrolimus  He has a history of rectal bleeding in the 1980s and 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  He has not had an echocardiogram since before his surgery  Over the last couple of years he has lost about 30 lb of weight voluntarily through diet control and exercise  His EKG today was unremarkable      He is a never smoker and is able to do moderate to heavy intensity exercise at home  He also has a job which involves a lot of heavy lifting  He denies chest pain, shortness of breath, PND, orthopnea, syncope, palpitations  Historical Information   Past Medical History:   Diagnosis Date    Kidney transplanted      Past Surgical History:   Procedure Laterality Date    NEPHRECTOMY TRANSPLANTED ORGAN       Social History   Social History     Substance and Sexual Activity   Alcohol Use No     Social History     Substance and Sexual Activity   Drug Use No     Social History     Tobacco Use   Smoking Status Former Smoker   Smokeless Tobacco Never Used     Family History:   Family History   Problem Relation Age of Onset    Autoimmune disease Mother     Hypertension Father     Autoimmune disease Sister        Meds/Allergies   all medications and allergies reviewed  No Known Allergies    Objective   Vitals: Blood pressure 124/64, pulse (!) 54, height 5' 11" (1 803 m), weight 81 4 kg (179 lb 6 4 oz)  [unfilled]    Invasive Devices     None                 Review of Systems:  Review of Systems   All other systems reviewed and are negative  General:  AO x3, no acute distress  Cardiac:  S1-S2 normal 2/6 systolic murmur +, non radiatingJVP:  Not seen  Lungs:  Clear to auscultation bilaterally, no wheezing or crackles    Abdomen:  Soft nontender nondistended, positive bowel sounds  Extremities:  Warm, well perfused, pulses palpable, no ulcers or rashes  Neuro: Grossly nonfocal        Physical Exam    Lab Results: I have personally reviewed pertinent lab results  Imaging: I have personally reviewed pertinent reports  Arnie Gilliam MD  Cardiology Fellow    ** Please Note: Fluency DirectDictation voice to text software may have been used in the creation of this document   **

## 2019-10-29 NOTE — PATIENT INSTRUCTIONS
Please continue to take your medications as directed    Call the office with questions/if any new symptoms develop

## 2019-10-29 NOTE — PROGRESS NOTES
H&P Exam - Cardiology   Aram Velazquez 61 y o  male MRN: [id number]  Unit/Bed#:  Encounter: [id number]    Assessment/Plan       Hypertension:  - blood pressure well controlled on current antihypertensive regimen started by his nephrologist, continue with this  History of renal transplant  On immunosuppressive therapy  Preventative cardiology:    - has a history of peripheral vascular disease and carotid disease,  - diabetes is well controlled with insulin pump  - cannot take aspirin for primary prophylaxis as he has history of retinal bleeding   - will switch pravastatin to rosuvastatin, goal LDL is less than 70   - patient counseled on lifestyle modification    Diabetes type 1:  HB A1c well controlled    Heart murmur: No new valvular abnormalities on echo, he has a long standing hx of this murmur      10/29/19: No complaints  Has been doing well, lipids are well controlled, LDL down to 56  History of Present Illness   HPI:  Aram Velazquez is a 61 y o  male  He has a history of kidney transplant 28 years ago, and it repeat transplant in 2013  He has no complaints at this time and is here to establish cardiovascular care  He has a history of peripheral vascular disease and carotid disease on previous testing  He has a history of hypertension and diabetes type 1  His blood pressure and his blood sugars have been well controlled  He is on chronic immunosuppressive therapy with prednisone CellCept, and tacrolimus  He has a history of rectal bleeding in the 1980s and 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  He follows up regularly with a nephrologist here and at [facility]  He has not had an echocardiogram since before his surgery  Over the last couple of years he has lost about 30 lb of weight voluntarily through diet control and exercise  His EKG today was unremarkable      He is a never smoker and is able to do moderate to heavy intensity exercise at home  He also has a job which involves a lot of heavy lifting      He denies chest pain, shortness of breath, PND, orthopnea, syncope, palpitations  Historical Information   Past Medical History:   Diagnosis Date    Kidney transplanted      Past Surgical History:   Procedure Laterality Date    NEPHRECTOMY TRANSPLANTED ORGAN       Social History   Social History     Substance and Sexual Activity   Alcohol Use No     Social History     Substance and Sexual Activity   Drug Use No     Social History     Tobacco Use   Smoking Status Former Smoker   Smokeless Tobacco Never Used     Family History:   Family History   Problem Relation Age of Onset    Autoimmune disease Mother     Hypertension Father     Autoimmune disease Sister        Meds/Allergies   all medications and allergies reviewed  No Known Allergies    Objective   Vitals: Blood pressure 124/64, pulse (!) 54, height 5' 11" (1 803 m), weight 81 4 kg (179 lb 6 4 oz)  [unfilled]    Invasive Devices     None                 Review of Systems:  Review of Systems   All other systems reviewed and are negative  General:  AO x3, no acute distress  Cardiac:  S1-S2 normal 2/6 systolic murmur +, non radiatingJVP:  Not seen  Lungs:  Clear to auscultation bilaterally, no wheezing or crackles  Abdomen:  Soft nontender nondistended, positive bowel sounds  Extremities:  Warm, well perfused, pulses palpable, no ulcers or rashes  Neuro: Grossly nonfocal        Physical Exam    Lab Results: I have personally reviewed pertinent lab results  Imaging: I have personally reviewed pertinent reports  Arnie Winters MD  Cardiology Fellow    ** Please Note: Fluency DirectDictation voice to text software may have been used in the creation of this document   **

## 2019-11-11 ENCOUNTER — OFFICE VISIT (OUTPATIENT)
Dept: NEPHROLOGY | Facility: CLINIC | Age: 59
End: 2019-11-11
Payer: COMMERCIAL

## 2019-11-11 VITALS
WEIGHT: 184 LBS | HEIGHT: 71 IN | BODY MASS INDEX: 25.76 KG/M2 | HEART RATE: 68 BPM | SYSTOLIC BLOOD PRESSURE: 128 MMHG | DIASTOLIC BLOOD PRESSURE: 76 MMHG

## 2019-11-11 DIAGNOSIS — I10 ESSENTIAL HYPERTENSION: ICD-10-CM

## 2019-11-11 DIAGNOSIS — Z94.0 RENAL TRANSPLANT RECIPIENT: Primary | ICD-10-CM

## 2019-11-11 DIAGNOSIS — D84.9 IMMUNOSUPPRESSION (HCC): ICD-10-CM

## 2019-11-11 DIAGNOSIS — M10.00 IDIOPATHIC GOUT, UNSPECIFIED CHRONICITY, UNSPECIFIED SITE: ICD-10-CM

## 2019-11-11 DIAGNOSIS — E08.00 DIABETES MELLITUS DUE TO UNDERLYING CONDITION WITH HYPEROSMOLARITY WITHOUT COMA, WITHOUT LONG-TERM CURRENT USE OF INSULIN (HCC): ICD-10-CM

## 2019-11-11 PROCEDURE — 99214 OFFICE O/P EST MOD 30 MIN: CPT | Performed by: INTERNAL MEDICINE

## 2019-11-11 NOTE — PATIENT INSTRUCTIONS
Chronic Kidney Disease   WHAT YOU NEED TO KNOW:   Chronic kidney disease (CKD) is the gradual and permanent loss of kidney function  It is also called chronic kidney failure, or chronic renal insufficiency  Normally, the kidneys remove fluid, chemicals, and waste from your blood  These wastes are turned into urine by your kidneys  CKD may worsen over time and lead to kidney failure  DISCHARGE INSTRUCTIONS:   Return to the emergency department if:   · You are confused and very drowsy  · You have a seizure  · You have shortness of breath  Contact your healthcare provider if:   · You suddenly gain or lose more weight than your healthcare provider has told you is okay  · You have itchy skin or a rash  · You urinate more or less than you normally do  · You have blood in your urine  · You have nausea and repeated vomiting  · You have fatigue or muscle weakness  · You have hiccups that will not stop  · You have questions or concerns about your condition or care  Medicines:   · Medicines  may be given to decrease blood pressure and get rid of extra fluid  You may also receive medicine to manage health conditions that may occur with CKD, such as anemia, diabetes, and heart disease  · Take your medicine as directed  Contact your healthcare provider if you think your medicine is not helping or if you have side effects  Tell him or her if you are allergic to any medicine  Keep a list of the medicines, vitamins, and herbs you take  Include the amounts, and when and why you take them  Bring the list or the pill bottles to follow-up visits  Carry your medicine list with you in case of an emergency  Follow up with your healthcare provider as directed: You will need to return for tests to monitor your kidney function  You may also be referred to a kidney specialist  Write down your questions so you remember to ask them during your visits  Manage other health conditions:   Follow your healthcare provider's directions on how to manage diabetes, high blood pressure, and heart disease  These conditions can make CKD worse  Talk to your healthcare provider before you take over-the-counter medicine  Medicines such as NSAIDs, stomach medicine, or laxatives may harm your kidneys  Weigh yourself daily:  Ask your healthcare provider what your weight should be  Ask how much liquid you should drink each day  CKD may cause you to gain or lose weight rapidly  Weigh yourself every day  Write down your weight, how much liquid you drink or eat, and how much you urinate each day  Contact your healthcare provider if your weight is higher or lower than it should be  Manage CKD:   · Maintain a healthy weight  Ask your healthcare provider how much you should weigh  Ask him to help you create a weight loss plan if you are overweight  · Exercise 30 to 60 minutes a day, 4 to 7 times a week, or as directed  Ask about the best exercise plan for you  Regular exercise can help you manage CKD, high blood pressure, and diabetes  · Follow your healthcare provider's advice about what to eat and drink  He may tell you to eat food low in sodium (salt), potassium, phosphorus, or protein  You may need to see a dietitian if you need help planning meals  Ask how much liquid to drink each day and which liquids are best for you  · Limit alcohol  Ask how much alcohol is safe for you to drink  A drink of alcohol is 12 ounces of beer, 5 ounces of wine, or 1½ ounces of liquor  · Do not smoke  Nicotine and other chemicals in cigarettes and cigars can cause lung and kidney damage  Ask your healthcare provider for information if you currently smoke and need help to quit  E-cigarettes or smokeless tobacco still contain nicotine  Talk to your healthcare provider before you use these products  · Ask your healthcare provider if you need vaccines    Infections such as pneumonia, influenza, and hepatitis can be more harmful or more likely to occur in a person who has CKD  Vaccines reduce your risk of infection with these viruses  © 2017 [facility] Information is for End User's use only and may not be sold, redistributed or otherwise used for commercial purposes  All illustrations and images included in CareNotes® are the copyrighted property of A D A M [employer] Inc  or Jacob Jarrell  The above information is an  only  It is not intended as medical advice for individual conditions or treatments  Talk to your doctor, nurse or pharmacist before following any medical regimen to see if it is safe and effective for you

## 2020-06-13 LAB
CREAT ?TM UR-SCNC: 19.7 UMOL/L
HBA1C MFR BLD HPLC: 6.2 %
MICROALBUMIN/CREAT UR: NORMAL MG/G{CREAT}

## 2020-08-17 LAB
LEFT EYE DIABETIC RETINOPATHY: NORMAL
RIGHT EYE DIABETIC RETINOPATHY: NORMAL

## 2020-09-03 LAB
LEFT EYE DIABETIC RETINOPATHY: NORMAL
RIGHT EYE DIABETIC RETINOPATHY: NORMAL

## 2020-10-13 ENCOUNTER — OFFICE VISIT (OUTPATIENT)
Dept: CARDIOLOGY CLINIC | Facility: CLINIC | Age: 60
End: 2020-10-13
Payer: COMMERCIAL

## 2020-10-13 VITALS
SYSTOLIC BLOOD PRESSURE: 118 MMHG | WEIGHT: 190 LBS | HEIGHT: 71 IN | DIASTOLIC BLOOD PRESSURE: 64 MMHG | HEART RATE: 60 BPM | BODY MASS INDEX: 26.6 KG/M2 | TEMPERATURE: 96.8 F

## 2020-10-13 DIAGNOSIS — E08.00 DIABETES MELLITUS DUE TO UNDERLYING CONDITION WITH HYPEROSMOLARITY WITHOUT COMA, WITHOUT LONG-TERM CURRENT USE OF INSULIN (HCC): ICD-10-CM

## 2020-10-13 DIAGNOSIS — I10 HYPERTENSION, UNSPECIFIED TYPE: Primary | ICD-10-CM

## 2020-10-13 DIAGNOSIS — I65.29 STENOSIS OF CAROTID ARTERY, UNSPECIFIED LATERALITY: ICD-10-CM

## 2020-10-13 PROCEDURE — 93000 ELECTROCARDIOGRAM COMPLETE: CPT | Performed by: INTERNAL MEDICINE

## 2020-10-13 PROCEDURE — 99215 OFFICE O/P EST HI 40 MIN: CPT | Performed by: INTERNAL MEDICINE

## 2020-10-13 RX ORDER — ROSUVASTATIN CALCIUM 10 MG/1
10 TABLET, COATED ORAL DAILY
Qty: 90 TABLET | Refills: 3 | Status: SHIPPED | OUTPATIENT
Start: 2020-10-13 | End: 2021-10-12 | Stop reason: SDUPTHER

## 2021-03-05 LAB — RIGHT EYE DIABETIC RETINOPATHY: NORMAL

## 2021-03-30 DIAGNOSIS — Z23 ENCOUNTER FOR IMMUNIZATION: ICD-10-CM

## 2021-08-21 ENCOUNTER — IMMUNIZATIONS (OUTPATIENT)
Dept: FAMILY MEDICINE CLINIC | Facility: HOSPITAL | Age: 61
End: 2021-08-21

## 2021-08-21 DIAGNOSIS — Z23 ENCOUNTER FOR IMMUNIZATION: Primary | ICD-10-CM

## 2021-08-21 PROCEDURE — 91300 SARS-COV-2 / COVID-19 MRNA VACCINE (PFIZER-BIONTECH) 30 MCG: CPT

## 2021-08-21 PROCEDURE — 0001A SARS-COV-2 / COVID-19 MRNA VACCINE (PFIZER-BIONTECH) 30 MCG: CPT

## 2021-10-12 DIAGNOSIS — E08.00 DIABETES MELLITUS DUE TO UNDERLYING CONDITION WITH HYPEROSMOLARITY WITHOUT COMA, WITHOUT LONG-TERM CURRENT USE OF INSULIN (HCC): ICD-10-CM

## 2021-10-12 DIAGNOSIS — I10 HYPERTENSION, UNSPECIFIED TYPE: ICD-10-CM

## 2021-10-12 RX ORDER — ROSUVASTATIN CALCIUM 10 MG/1
10 TABLET, COATED ORAL DAILY
Qty: 90 TABLET | Refills: 0 | Status: SHIPPED | OUTPATIENT
Start: 2021-10-12 | End: 2022-01-05

## 2021-12-29 ENCOUNTER — OFFICE VISIT (OUTPATIENT)
Dept: CARDIOLOGY CLINIC | Facility: CLINIC | Age: 61
End: 2021-12-29
Payer: COMMERCIAL

## 2021-12-29 VITALS
HEIGHT: 71 IN | OXYGEN SATURATION: 98 % | BODY MASS INDEX: 26.04 KG/M2 | HEART RATE: 54 BPM | WEIGHT: 186 LBS | DIASTOLIC BLOOD PRESSURE: 60 MMHG | SYSTOLIC BLOOD PRESSURE: 100 MMHG

## 2021-12-29 DIAGNOSIS — I73.9 PERIPHERAL ARTERIAL DISEASE (HCC): ICD-10-CM

## 2021-12-29 DIAGNOSIS — I65.23 CAROTID STENOSIS, ASYMPTOMATIC, BILATERAL: ICD-10-CM

## 2021-12-29 DIAGNOSIS — I10 HYPERTENSION, UNSPECIFIED TYPE: Primary | ICD-10-CM

## 2021-12-29 PROCEDURE — 93000 ELECTROCARDIOGRAM COMPLETE: CPT | Performed by: INTERNAL MEDICINE

## 2021-12-29 PROCEDURE — 99213 OFFICE O/P EST LOW 20 MIN: CPT | Performed by: INTERNAL MEDICINE

## 2022-01-05 DIAGNOSIS — I10 HYPERTENSION, UNSPECIFIED TYPE: ICD-10-CM

## 2022-01-05 DIAGNOSIS — E08.00 DIABETES MELLITUS DUE TO UNDERLYING CONDITION WITH HYPEROSMOLARITY WITHOUT COMA, WITHOUT LONG-TERM CURRENT USE OF INSULIN (HCC): ICD-10-CM

## 2022-01-05 RX ORDER — ROSUVASTATIN CALCIUM 10 MG/1
TABLET, COATED ORAL
Qty: 90 TABLET | Refills: 0 | Status: SHIPPED | OUTPATIENT
Start: 2022-01-05 | End: 2022-04-26 | Stop reason: SDUPTHER

## 2022-04-26 DIAGNOSIS — E08.00 DIABETES MELLITUS DUE TO UNDERLYING CONDITION WITH HYPEROSMOLARITY WITHOUT COMA, WITHOUT LONG-TERM CURRENT USE OF INSULIN (HCC): ICD-10-CM

## 2022-04-26 DIAGNOSIS — I10 HYPERTENSION, UNSPECIFIED TYPE: ICD-10-CM

## 2022-04-26 NOTE — TELEPHONE ENCOUNTER
Requested medication(s) are due for refill today: Yes  Patient has already received a courtesy refill: No  Other reason request has been forwarded to provider:      Powered by [employer]02/26/2022 11:53 AM  Information pending  Cardiovascular:  Antilipid - Statins Failed 04/26/2022 11:53 AM   Protocol Details  Total Cholesterol within 360 days    LDL within 360 days    HDL within 360 days    Triglycerides within 360 days    Valid encounter within last 12 months

## 2022-04-27 RX ORDER — ROSUVASTATIN CALCIUM 10 MG/1
10 TABLET, COATED ORAL DAILY
Qty: 90 TABLET | Refills: 0 | Status: SHIPPED | OUTPATIENT
Start: 2022-04-27

## 2022-08-04 ENCOUNTER — TELEPHONE (OUTPATIENT)
Dept: NEPHROLOGY | Facility: CLINIC | Age: 62
End: 2022-08-04

## 2022-08-04 DIAGNOSIS — Z94.0 RENAL TRANSPLANT RECIPIENT: Primary | ICD-10-CM

## 2022-08-04 DIAGNOSIS — I10 HYPERTENSION, UNSPECIFIED TYPE: ICD-10-CM

## 2022-08-04 NOTE — TELEPHONE ENCOUNTER
I spoke with the pt reminding him to obtain labs prior to his visit with Dr Fifi Barrett, pt verbalized understanding and will obtain at a [facility]

## 2022-08-06 ENCOUNTER — APPOINTMENT (OUTPATIENT)
Dept: LAB | Facility: HOSPITAL | Age: 62
End: 2022-08-06
Attending: INTERNAL MEDICINE
Payer: COMMERCIAL

## 2022-08-06 DIAGNOSIS — I10 HYPERTENSION, UNSPECIFIED TYPE: ICD-10-CM

## 2022-08-06 DIAGNOSIS — Z94.0 RENAL TRANSPLANT RECIPIENT: ICD-10-CM

## 2022-08-06 LAB
BACTERIA UR QL AUTO: NORMAL /HPF
BILIRUB UR QL STRIP: NEGATIVE
CLARITY UR: CLEAR
COLOR UR: NORMAL
GLUCOSE UR STRIP-MCNC: NEGATIVE MG/DL
HGB UR QL STRIP.AUTO: NEGATIVE
KETONES UR STRIP-MCNC: NEGATIVE MG/DL
LEUKOCYTE ESTERASE UR QL STRIP: NEGATIVE
NITRITE UR QL STRIP: NEGATIVE
NON-SQ EPI CELLS URNS QL MICRO: NORMAL /HPF
PH UR STRIP.AUTO: 6 [PH]
PROT UR STRIP-MCNC: NEGATIVE MG/DL
RBC #/AREA URNS AUTO: NORMAL /HPF
SP GR UR STRIP.AUTO: 1.01 (ref 1–1.03)
UROBILINOGEN UR STRIP-ACNC: <2 MG/DL
WBC #/AREA URNS AUTO: NORMAL /HPF

## 2022-08-06 PROCEDURE — 81001 URINALYSIS AUTO W/SCOPE: CPT

## 2022-08-08 ENCOUNTER — TELEPHONE (OUTPATIENT)
Dept: NEPHROLOGY | Facility: CLINIC | Age: 62
End: 2022-08-08

## 2022-08-08 NOTE — TELEPHONE ENCOUNTER
Appointment Confirmation   Person confirmed appointment with  If not patient, name of the person Patient    Date and time of appointment 8/9  10:00    Patient acknowledged and will be at appointment? yes    Did you advise the patient that they will need a urine sample if they are a new patient?  N/A    Did you advise the patient to bring their current medications for verification? (including any OTC) Yes    Additional Information

## 2022-08-09 ENCOUNTER — OFFICE VISIT (OUTPATIENT)
Dept: NEPHROLOGY | Facility: CLINIC | Age: 62
End: 2022-08-09
Payer: COMMERCIAL

## 2022-08-09 VITALS
HEIGHT: 71 IN | BODY MASS INDEX: 27.02 KG/M2 | WEIGHT: 193 LBS | DIASTOLIC BLOOD PRESSURE: 64 MMHG | SYSTOLIC BLOOD PRESSURE: 118 MMHG

## 2022-08-09 DIAGNOSIS — D84.9 IMMUNOSUPPRESSION (HCC): ICD-10-CM

## 2022-08-09 DIAGNOSIS — Z94.0 RENAL TRANSPLANT RECIPIENT: Primary | ICD-10-CM

## 2022-08-09 DIAGNOSIS — M10.00 IDIOPATHIC GOUT, UNSPECIFIED CHRONICITY, UNSPECIFIED SITE: ICD-10-CM

## 2022-08-09 DIAGNOSIS — E08.00 DIABETES MELLITUS DUE TO UNDERLYING CONDITION WITH HYPEROSMOLARITY WITHOUT COMA, WITHOUT LONG-TERM CURRENT USE OF INSULIN (HCC): ICD-10-CM

## 2022-08-09 DIAGNOSIS — I10 HYPERTENSION, UNSPECIFIED TYPE: ICD-10-CM

## 2022-08-09 PROCEDURE — 99214 OFFICE O/P EST MOD 30 MIN: CPT | Performed by: INTERNAL MEDICINE

## 2022-08-09 RX ORDER — HYDRALAZINE HYDROCHLORIDE 25 MG/1
12.5 TABLET, FILM COATED ORAL 3 TIMES DAILY
Start: 2022-08-09

## 2022-08-09 NOTE — PATIENT INSTRUCTIONS
Chronic Kidney Disease   WHAT YOU NEED TO KNOW:   Chronic kidney disease (CKD) is the gradual and permanent loss of kidney function  It is also called chronic kidney failure, or chronic renal insufficiency  Normally, the kidneys remove fluid, chemicals, and waste from your blood  These wastes are turned into urine by your kidneys  CKD may worsen over time and lead to kidney failure  Your CKD team will help you and your family plan for your care at home  The team will help you create goals and find ways to meet your goals  Your care plan may change over time as your needs change  DISCHARGE INSTRUCTIONS:   Call your local emergency number (911 in the [address]) if:   You have a seizure  You have shortness of breath  Return to the emergency department if:   You are confused and very drowsy  Call your doctor or nephrologist if:   You suddenly gain or lose more weight than your healthcare provider has told you is okay  You have itchy skin or a rash  You urinate more or less than you normally do  You have blood in your urine  You have nausea and are vomiting  You have fatigue or muscle weakness  You have hiccups that will not stop  You have questions or concerns about your condition or care  Medicines:   Medicines  may be given to decrease blood pressure and get rid of extra fluid  You may also receive medicine to manage health conditions that may occur with CKD, such as anemia, diabetes, and heart disease  Take your medicine as directed  Contact your healthcare provider if you think your medicine is not helping or if you have side effects  Tell him or her if you are allergic to any medicine  Keep a list of the medicines, vitamins, and herbs you take  Include the amounts, and when and why you take them  Bring the list or the pill bottles to follow-up visits  Carry your medicine list with you in case of an emergency  What you can do to manage CKD:   Management may include making some lifestyle changes  Tell your healthcare provider if you have any concerns about being able to make changes  He or she can help you find solutions, including working with specialists  Ask for help creating a plan to break large goals into smaller steps  Your plan may include any of the following:  Manage other health conditions  Your healthcare provider will work with you to make a care plan that meets your needs  You will be checked regularly for heart disease or other conditions that can make CKD worse, such as diabetes  Your blood pressure will be closely monitored  You will also get a target blood pressure and help making a plan to reach your target  This may include taking your blood pressure at home  Maintain a healthy weight  Your weight and body mass index (BMI) will be checked regularly  BMI helps find if your weight is healthy for your height  Your healthcare provider will use other tests to check your muscle and protein levels  Extra weight can strain your kidneys  A low weight or low muscle mass can make you feel more tired  You may have trouble doing your daily activities  Ask your provider what a healthy weight is for you  He or she can help you create a plan to lose or gain weight safely, if needed  The plan may include keeping a food diary  This is a list of foods and liquids you have each day  Your provider will use the diary to help you make changes, if needed  Changes are based on your health and any other conditions you have, such as diabetes  Create an exercise plan  Regular exercise can help you manage CKD, high blood pressure, and diabetes  Exercise also helps control weight  Your provider can help you create exercise goals and a plan to reach those goals  For example, your goal may be to exercise for 30 minutes in a day  Your plan can include breaking exercise into 10 minute sessions, 3 times during the day  Create a healthy eating plan    Your provider may tell you to eat food low in potassium, phosphorus, or protein  Your provider may also recommend vitamin or mineral supplements  Do not take any supplements without talking to your provider  A dietitian can help you plan meals if needed  Ask how much liquid to drink each day and which liquids are best for you  Limit sodium (salt) as directed  You may need to limit sodium to less than 2,300 milligrams (mg) each day  Ask your dietitian or healthcare provider how much sodium you can have each day  The amount depends on your stage of kidney disease  Table salt, canned foods, soups, salted snacks, and processed meats, like deli meats and sausage, are high in sodium  Your provider or a dietitian can show you how to read food labels for sodium  Limit alcohol as directed  Alcohol can cause fluid retention and can affect your kidneys  Ask how much alcohol is safe for you  A drink of alcohol is 12 ounces of beer, 5 ounces of wine, or 1½ ounces of liquor  Do not smoke  Nicotine and other chemicals in cigarettes and cigars can cause kidney damage  Ask your provider for information if you currently smoke and need help to quit  E-cigarettes or smokeless tobacco still contain nicotine  Talk to your provider before you use these products  Ask about over-the-counter medicines  Medicines such as NSAIDs and laxatives may harm your kidneys  Some cough and cold medicines can raise your blood pressure  Always ask if a medicine is safe before you take it  Ask about vaccines you may need  CKD can increase your risk for infections such as pneumonia, influenza, and hepatitis  Vaccines lower your risk for infection  Your healthcare provider will tell you which vaccines you need and when to get them  Follow up with your doctor or nephrologist as directed: You will need to return for tests to monitor your kidney and nerve function, and your parathyroid hormone level   Your medicines may be changed, based on certain test results  Write down your questions so you remember to ask them during your visits  © Copyright [employer] 2022 Information is for End User's use only and may not be sold, redistributed or otherwise used for commercial purposes  All illustrations and images included in CareNotes® are the copyrighted property of [employer]  or Stephanie Sadler  The above information is an  only  It is not intended as medical advice for individual conditions or treatments  Talk to your doctor, nurse or pharmacist before following any medical regimen to see if it is safe and effective for you

## 2022-08-09 NOTE — PROGRESS NOTES
OFFICE FOLLOW UP - Nephrology   Mellisa Dinero 58 y o  male MRN: [id number]    Encounter: [id number]          ASSESSMENT and PLAN:  Diagnoses and all orders for this visit:    55-year-old with a past medical history of type 1 diabetes with hypertension status post  donor renal transplant here for follow-up    Renal transplant, status post  - his creatinine remained stable between 0 8 in 1 2 an continues to do well from a transplant standpoint  - follows up every 6 months at the [address] [place] [address] and every 6 months with myself so is seeing a nephrologist every 3 months with repeat blood work ordered by "[employer]  - continue cardiovascular risk reduction measures with blood pressure control, diabetic control, lipid control    [facility]upp[facility] ([facility] )  - currently on CellCept 500 mg twice daily  - tacrolimus 2/ 5 2 times daily last  tacrolimus level was 6 3  -prednisone 5 mg daily   -continue ongoing management per his transplant center    Idiopathic gout, unspecified chronicity, unspecified site  - now well controlled on allopurinol 100 mg daily will continue    Diabetes mellitus due to underlying condition with hyperosmolarity without coma, without long-term current use of insulin ([facility] )  - ongoing glycemic control follows up with endocrinology regularly     hypertension  -hydralazine 25 mg 3 times daily, losartan 25 mg daily, metoprolol 25 mg twice daily  -is having some low blood pressures we can decrease the hydralazine to 12 5 mg 3 times daily to see if this stabilizes  -blood pressure well controlled today he does get some lower blood pressures at home decrease his hydralazine to 12 5 mg 3 times daily    Hyponatremia  -sodium is been acceptable    Continues to do well Will follow up every 6 months    HPI: Mellisa Dinero is a 58 y o  male who is here for  Follow-up    Since our last visit, there has been no ER visits or hospitilizations   Patient currently has no complaints at this time and is feeling well  Patient denies any chest pain, shortness of breath and swelling  The last blood work was done recently    Overall he has no acute complaints no chest pain or shortness of breath no fevers or chills no nausea vomiting diarrhea constipation no foamy or bloody urine tolerating his medications continues to work night shift in overall has no acute complaints    ROS:   All the systems were reviewed and were negative except as documented on the HPI  Allergies: Patient has no known allergies  Medications:   Current Outpatient Medications:     allopurinol (ZYLOPRIM) 100 mg tablet, Take 1 tablet by mouth daily, Disp: , Rfl:     Cholecalciferol 2000 units CAPS, Take 2,000 Units by mouth daily  , Disp: , Rfl:     hydrALAZINE (APRESOLINE) 25 mg tablet, Take 0 5 tablets (12 5 mg total) by mouth 3 (three) times a day, Disp: , Rfl:     insulin aspart (NovoLOG) 100 units/mL injection, Inject 100 Units under the skin As needed  varies , Disp: , Rfl:     levothyroxine 75 mcg tablet, Take 1 tablet by mouth daily, Disp: , Rfl:     losartan (COZAAR) 25 mg tablet, Take 1 tablet by mouth daily, Disp: , Rfl:     metoprolol tartrate (LOPRESSOR) 50 mg tablet, Take 25 mg by mouth every 12 (twelve) hours Sometimes bp gets low   Below diastolic 60 hold off , Disp: , Rfl:     Multiple Vitamin (MULTI-DAY) TABS, Take 1 tablet by mouth daily, Disp: , Rfl:     mycophenolate (CELLCEPT) 250 mg capsule, Take 2 capsules by mouth 2 (two) times a day, Disp: , Rfl:     predniSONE 5 mg tablet, Take 1 tablet by mouth daily, Disp: , Rfl:     rosuvastatin (CRESTOR) 10 MG tablet, Take 1 tablet (10 mg total) by mouth daily, Disp: 90 tablet, Rfl: 0    tacrolimus (PROGRAF) 1 mg capsule, Take 2 mg by mouth every 12 (twelve) hours Takes 2 mg in the am and 1 5 mg in the pm  , Disp: , Rfl:     ACCU-CHEK KAMI PLUS test strip, 6 (six) times a day Test (Patient not taking: No sig reported), Disp: , Rfl: 4    Past Medical History:   Diagnosis Date  Kidney transplanted      Past Surgical History:   Procedure Laterality Date    NEPHRECTOMY TRANSPLANTED ORGAN       Family History   Problem Relation Age of Onset    Autoimmune disease Mother     Hypertension Father     Autoimmune disease Sister       reports that he has quit smoking  His smoking use included cigarettes  He has never used smokeless tobacco  He reports that he does not drink alcohol and does not use drugs  Physical Exam:   Vitals:    08/09/22 0947   BP: 118/64   BP Location: Right arm   Patient Position: Sitting   Cuff Size: Standard   Weight: 87 5 kg (193 lb)   Height: 5' 11" (1 803 m)     Body mass index is 26 92 kg/m²      General: conscious, cooperative, in not acute distress  Eyes: conjunctivae pink, anicteric sclerae  ENT: lips and mucous membranes moist  Neck: supple, no JVD  Chest: clear breath sounds bilateral, no crackles, ronchus or wheezings  CVS: distinct S1 & S2, normal rate, regular rhythm  Abdomen: soft, non-tender, non-distended, normoactive bowel sounds  Extremities: no edema of both legs  Skin: no rash  Neuro: awake, alert, oriented      Lab Results:    Results for orders placed or performed in visit on 08/04/22   Urinalysis with microscopic   Result Value Ref Range    Color, UA Light Yellow     Clarity, UA Clear     Specific [place], UA 1 014 1 003 - 1 030    pH, UA 6 0 4 5, 5 0, 5 5, 6 0, 6 5, 7 0, 7 5, 8 0    Leukocytes, UA Negative Negative    Nitrite, UA Negative Negative    Protein, UA Negative Negative mg/dl    Glucose, UA Negative Negative mg/dl    Ketones, UA Negative Negative mg/dl    Urobilinogen, UA <2 0 <2 0 mg/dl mg/dl    Bilirubin, UA Negative Negative    Occult Blood, UA Negative Negative    RBC, UA None Seen None Seen, 1-2 /hpf    WBC, UA None Seen None Seen, 1-2 /hpf    Epithelial Cells None Seen None Seen, Occasional /hpf    Bacteria, UA None Seen None Seen, Occasional /hpf     Last creatinine 1 03 last tacrolimus 6 5 from September, urine protein to creatinine ratio negative BK negative all other electrolytes reviewed sodium was 134 other electrolytes stable    Labs reviewed in Care everywhere    Portions of the record may have been created with voice recognition software  Occasional wrong word or "sound a like" substitutions may have occurred due to the inherent limitations of voice recognition software  Read the chart carefully and recognize, using context, where substitutions have occurred  If you have any questions, please contact the dictating provider

## 2022-08-26 ENCOUNTER — TELEPHONE (OUTPATIENT)
Dept: NEPHROLOGY | Facility: CLINIC | Age: 62
End: 2022-08-26

## 2022-08-26 NOTE — TELEPHONE ENCOUNTER
Called patient to schedule appt; patient stated he was currently at work and would give us a call Monday 8/29 to schedule  First attempt

## 2022-08-29 NOTE — TELEPHONE ENCOUNTER
Spoke with Inez Marsh and schedule appointment for 2/9/23 with Orin Bosworth in the [facility]

## 2022-09-12 ENCOUNTER — TELEPHONE (OUTPATIENT)
Dept: NEPHROLOGY | Facility: CLINIC | Age: 62
End: 2022-09-12

## 2022-09-12 NOTE — TELEPHONE ENCOUNTER
Reschedule Appointment   Person speaking to  patient    Date of original appointment 02/09   New appointment date 02/10   Patient on dialysis no   [facility]   Provider Lizzeth [facility]guire    Additional Information Change in [place] schedule

## 2022-12-08 ENCOUNTER — OFFICE VISIT (OUTPATIENT)
Dept: CARDIOLOGY CLINIC | Facility: CLINIC | Age: 62
End: 2022-12-08

## 2022-12-08 VITALS
DIASTOLIC BLOOD PRESSURE: 60 MMHG | SYSTOLIC BLOOD PRESSURE: 100 MMHG | BODY MASS INDEX: 28.14 KG/M2 | WEIGHT: 201 LBS | HEIGHT: 71 IN | HEART RATE: 56 BPM

## 2022-12-08 DIAGNOSIS — I65.23 CAROTID STENOSIS, ASYMPTOMATIC, BILATERAL: ICD-10-CM

## 2022-12-08 DIAGNOSIS — I10 PRIMARY HYPERTENSION: Primary | ICD-10-CM

## 2022-12-08 DIAGNOSIS — I73.9 PERIPHERAL ARTERIAL DISEASE (HCC): ICD-10-CM

## 2022-12-08 NOTE — PROGRESS NOTES
General Cardiology - Outpatient Progress Note   Denia Mtz 58 y o  male   MRN: [id number]  @ Encounter: [id number]      Interval History:   Since last encounter, patient reports no new symptoms  He continues to walk 5-6 miles daily at work, as well as run occasionally and does not get leg pain/cramps, chest pain, shortness of breath, fatigue, or headache  He reports no issues with his vision and has not experienced any weakness or paraesthesias  Cardiac History Summary:   Denia Mtz is a 58y o  year old male with a history of asymptomatic PAD and carotid stenosis, HTN, HLD, insulin-dependent DM, and renal transplant here for annual follow up  Objective:  Review of Systems  Review of system was conducted and was negative except for as stated in the interval history      Physical Exam:  Vitals: Blood pressure 100/60, pulse 56, height 5' 11" (1 803 m), weight 91 2 kg (201 lb)  , Body mass index is 28 03 kg/m²  GEN: Denia Mtz appears well, alert and oriented x 3, pleasant and cooperative   NECK:  No JVD or carotid bruits   HEART: normal rhythm, normal rate, normal S1 and S2, no murmurs, clicks, gallops or rubs   LUNGS: Clear to auscultation bilaterally; no wheezes, rales, or rhonchi; respiration nonlabored   ABDOMEN:  Soft, non-tender, non-distended  EXTREMITIES: radial pulses palpable; no edema   SKIN:  Dry, intact, warm to touch    Home Medications: (Not in a hospital admission)      Current Outpatient Medications:   •  allopurinol (ZYLOPRIM) 100 mg tablet, Take 1 tablet by mouth daily, Disp: , Rfl:   •  Cholecalciferol 2000 units CAPS, Take 2,000 Units by mouth daily  , Disp: , Rfl:   •  hydrALAZINE (APRESOLINE) 25 mg tablet, Take 0 5 tablets (12 5 mg total) by mouth 3 (three) times a day, Disp: , Rfl:   •  insulin aspart (NovoLOG) 100 units/mL injection, Inject 100 Units under the skin As needed   varies , Disp: , Rfl:   •  levothyroxine 75 mcg tablet, Take 1 tablet by mouth daily, Disp: , Rfl:   •  losartan (COZAAR) 25 mg tablet, Take 1 tablet by mouth daily, Disp: , Rfl:   •  metoprolol tartrate (LOPRESSOR) 50 mg tablet, Take 25 mg by mouth every 12 (twelve) hours Sometimes bp gets low  Below diastolic 60 hold off 36DF in the PM and 12 5 in the AM , Disp: , Rfl:   •  Multiple Vitamin (MULTI-DAY) TABS, Take 1 tablet by mouth daily, Disp: , Rfl:   •  mycophenolate (CELLCEPT) 250 mg capsule, Take 2 capsules by mouth 2 (two) times a day, Disp: , Rfl:   •  predniSONE 5 mg tablet, Take 1 tablet by mouth daily, Disp: , Rfl:   •  rosuvastatin (CRESTOR) 10 MG tablet, Take 1 tablet (10 mg total) by mouth daily, Disp: 90 tablet, Rfl: 0  •  tacrolimus (PROGRAF) 1 mg capsule, Take 2 mg by mouth every 12 (twelve) hours Takes 2 mg in the am and 1 5 mg in the pm  , Disp: , Rfl:   •  ACCU-CHEK KAMI PLUS test strip, 6 (six) times a day Test (Patient not taking: Reported on 2021), Disp: , Rfl: 4    Labs & Results:  No results found for: TROPONINI  Lab Results   Component Value Date    HGBA1C 6 0 (H) 2022    HGBA1C 5 7 (H) 2021     Lab Results   Component Value Date    K 4 1 2018    CO2 22 2018     2018    BUN 12 2018    CREATININE 1 09 2018    ALT 37 2016    AST 17 2016     Lab Results   Component Value Date    WBC 6 41 2016    HGB 14 9 2016    HCT 45 4 2016    MCV 96 2016     2016     No results found for: INR    EKG:  Date:   Interpretation: NSR    Imaging: I have personally reviewed pertinent reports        ECHO:  Results for orders placed during the hospital encounter of 18    Echo complete with contrast if indicated    Narrative  [facility], [address] [facility]  [phone]    Transthoracic Echocardiogram  2D, M-mode, Doppler, and Color Doppler    Study date:  2018    Patient: Estuardo Gaona  MR number: [id number]  Account number: [de-identified]  : 1960  Age: 62 years  Gender: Male  Status: Outpatient  Location: [address] [place] [address]  Height: 71 in  Weight: 193 6 lb  BP: 120/ 82 mmHg    Indications: Hypertension  Diagnoses: I10  - Essential (primary) hypertension    Sonographer:  [facility]  Primary Physician:  Gerald Montesinos MD  Referring Physician:  Don Tubbs MD  Group:  [facility]  Interpreting Physician:  Meggan Singh MD    SUMMARY    LEFT VENTRICLE:  Size was normal   Systolic function was normal  Ejection fraction was estimated to be 65 %  There was mild concentric hypertrophy  Features were consistent with a pseudonormal left ventricular filling pattern, with concomitant abnormal relaxation and increased filling pressure (grade 2 diastolic dysfunction)  RIGHT VENTRICLE:  The size was normal   Systolic function was normal     LEFT ATRIUM:  The atrium was mildly dilated  RIGHT ATRIUM:  The atrium was mildly dilated  MITRAL VALVE:  There was trace regurgitation  TRICUSPID VALVE:  There was trace regurgitation  HISTORY: PRIOR HISTORY: Hypertension  DM  Kidney transplant  PROCEDURE: The study was performed in the [address] [place] [address]  This was a routine study  The transthoracic approach was used  The study included complete 2D imaging, M-mode, complete spectral Doppler, and color Doppler  The  heart rate was 60 bpm, at the start of the study  Echocardiographic views were limited due to lung interference  Image quality was adequate  LEFT VENTRICLE: Size was normal  Systolic function was normal  Ejection fraction was estimated to be 65 %  There were no regional wall motion abnormalities  Wall thickness was mildly increased  There was mild concentric hypertrophy  DOPPLER: Features were consistent with a pseudonormal left ventricular filling pattern, with concomitant abnormal relaxation and increased filling pressure (grade 2 diastolic dysfunction)      RIGHT VENTRICLE: The size was normal  Systolic function was normal  Wall thickness was normal     LEFT ATRIUM: The atrium was mildly dilated  RIGHT ATRIUM: The atrium was mildly dilated  MITRAL VALVE: Valve structure was normal  There was normal leaflet separation  DOPPLER: The transmitral velocity was within the normal range  There was no evidence for stenosis  There was trace regurgitation  AORTIC VALVE: The valve was trileaflet  Leaflets exhibited normal thickness and normal cuspal separation  DOPPLER: Transaortic velocity was within the normal range  There was no evidence for stenosis  There was no regurgitation  TRICUSPID VALVE: The valve structure was normal  There was normal leaflet separation  DOPPLER: The transtricuspid velocity was within the normal range  There was no evidence for stenosis  There was trace regurgitation  The tricuspid jet  envelope definition was inadequate for estimation of RV systolic pressure  There are no indirect findings suggestive of moderate or severe pulmonary hypertension  PULMONIC VALVE: Leaflets exhibited normal thickness, no calcification, and normal cuspal separation  DOPPLER: The transpulmonic velocity was within the normal range  There was trace regurgitation  PERICARDIUM: There was no pericardial effusion  The pericardium was normal in appearance  AORTA: The root exhibited normal size  SYSTEMIC VEINS: IVC: The inferior vena cava was normal in size and course   Respirophasic changes were normal     SYSTEM MEASUREMENT TABLES    2D  %FS: 42 37 %  AV Diam: 3 14 cm  EDV(Teich): 117 94 ml  EF(Cube): 80 86 %  EF(Teich): 73 19 %  ESV(Cube): 23 85 ml  ESV(Teich): 31 62 ml  IVSd: 1 34 cm  LA Area: 24 39 cm2  LA Diam: 3 68 cm  LVEDV MOD A4C: 70 16 ml  LVEF MOD A4C: 81 88 %  LVESV MOD A4C: 12 71 ml  LVIDd: 4 99 cm  LVIDs: 2 88 cm  LVLd A4C: 7 64 cm  LVLs A4C: 5 51 cm  LVOT Diam: 1 87 cm  LVPWd: 0 93 cm  RA Area: 20 02 cm2  RV Diam: 3 35 cm  SI(Cube): 48 43 ml/m2  SI(Teich): 41 5 ml/m2  SV MOD A4C: 57 45 ml  SV(Cube): 100 74 ml  SV(Teich): 86 31 ml    CW  AV Env  Ti: 377 08 ms  AV MaxP 26 mmHg  AV SI: 185 44 ml/m2  AV SV: 385 71 ml  AV VTI: 49 93 cm  AV Vmax: 2 14 m/s  AV Vmean: 1 32 m/s  AV meanP 45 mmHg  TR MaxP 28 mmHg  TR Vmax: 2 66 m/s    MM  TAPSE: 2 61 cm    PW  MARIA G (VTI): 2 36 cm2  MARIA G Vmax: 2 02 cm2  E': 0 08 m/s  E/E': 17 43  HR: 59 67 BPM  LVCI Dopp: 3 38 l/minm2  LVCO Dopp: 7 04 L/min  LVOT Env  Ti: 428 84 ms  LVOT VTI: 42 84 cm  LVOT Vmax: 1 57 m/s  LVOT Vmean: 1 m/s  LVOT maxP 82 mmHg  LVOT meanP 78 mmHg  LVSI Dopp: 56 69 ml/m2  LVSV Dopp: 117 91 ml  MV A [place]: 0 89 m/s  MV Dec [place]: 6 89 m/s2  MV DecT: 207 46 ms  MV E [place]: 1 43 m/s  MV E/A Ratio: 1 6    [facility] Accredited Echocardi[facility]    Prepared and electronically signed by    Daniel Cobb MD  Signed [id number] 16:47:49    No results found for this or any previous visit  Assessment/Plan     Asymptomatic Moderate Bilateral Peripheral Arterial Disease    Asymptomatic Carotid Artery Stenosis    Renal Transplant    HTN, well controlled    HLD    Diabetes Mellitus, well controlled    Mr Perry Jacobsen presents for annual follow-up  His BP and blood sugars remain well controlled on his current regimen  He is continuing with Metoprolol Tartrate 25mg Q AM and 12 5 QPM with dose reduced due to mild orthostatic symptoms, which have improved, as well as Losartan and Hydralazine  Last A1c was 5 7 this year  He is due for a lipid check this year with last LDL from 2021 at goal on Rosuvastatin 10mg  He is following with his nephrologist at [facility] for management of his transplant medications  He had an echo in 2018 showing overall normal LV systolic function  Carotid duplex in 2018 showed <50% carotid occlusion bilaterally  Arterial studies in 2016 showed non-compressible NAS, but TBI 0 6 on the right and 0 42 on the left       Given that he is asymptomatic from his carotid stenosis and his peripheral arterial disease, and has difficulty making all his medical testing appointments, it is reasonable to defer surveillance screening for now  Will follow up annually       [facility]  Cardiology Fellow

## 2023-01-15 ENCOUNTER — APPOINTMENT (OUTPATIENT)
Dept: LAB | Age: 63
End: 2023-01-15

## 2023-01-15 DIAGNOSIS — E10.65 TYPE I DIABETES MELLITUS WITH HYPEROSMOLAR COMA (HCC): ICD-10-CM

## 2023-01-15 DIAGNOSIS — E03.9 MYXEDEMA HEART DISEASE: ICD-10-CM

## 2023-01-15 DIAGNOSIS — E87.0 TYPE I DIABETES MELLITUS WITH HYPEROSMOLAR COMA (HCC): ICD-10-CM

## 2023-01-15 DIAGNOSIS — E10.69 TYPE I DIABETES MELLITUS WITH HYPEROSMOLAR COMA (HCC): ICD-10-CM

## 2023-01-15 DIAGNOSIS — I51.9 MYXEDEMA HEART DISEASE: ICD-10-CM

## 2023-01-15 LAB
ALBUMIN SERPL BCP-MCNC: 3.6 G/DL (ref 3.5–5)
ALP SERPL-CCNC: 68 U/L (ref 46–116)
ALT SERPL W P-5'-P-CCNC: 31 U/L (ref 12–78)
ANION GAP SERPL CALCULATED.3IONS-SCNC: 5 MMOL/L (ref 4–13)
AST SERPL W P-5'-P-CCNC: 21 U/L (ref 5–45)
BASOPHILS # BLD AUTO: 0.06 THOUSANDS/ÂΜL (ref 0–0.1)
BASOPHILS NFR BLD AUTO: 1 % (ref 0–1)
BILIRUB SERPL-MCNC: 0.72 MG/DL (ref 0.2–1)
BUN SERPL-MCNC: 11 MG/DL (ref 5–25)
CALCIUM SERPL-MCNC: 9 MG/DL (ref 8.3–10.1)
CHLORIDE SERPL-SCNC: 105 MMOL/L (ref 96–108)
CO2 SERPL-SCNC: 28 MMOL/L (ref 21–32)
CREAT SERPL-MCNC: 0.98 MG/DL (ref 0.6–1.3)
EOSINOPHIL # BLD AUTO: 0.15 THOUSAND/ÂΜL (ref 0–0.61)
EOSINOPHIL NFR BLD AUTO: 3 % (ref 0–6)
ERYTHROCYTE [DISTWIDTH] IN BLOOD BY AUTOMATED COUNT: 12.8 % (ref 11.6–15.1)
EST. AVERAGE GLUCOSE BLD GHB EST-MCNC: 108 MG/DL
GFR SERPL CREATININE-BSD FRML MDRD: 82 ML/MIN/1.73SQ M
GLUCOSE P FAST SERPL-MCNC: 50 MG/DL (ref 65–99)
HBA1C MFR BLD: 5.4 %
HCT VFR BLD AUTO: 45.2 % (ref 36.5–49.3)
HGB BLD-MCNC: 14.4 G/DL (ref 12–17)
IMM GRANULOCYTES # BLD AUTO: 0.04 THOUSAND/UL (ref 0–0.2)
IMM GRANULOCYTES NFR BLD AUTO: 1 % (ref 0–2)
LYMPHOCYTES # BLD AUTO: 1.54 THOUSANDS/ÂΜL (ref 0.6–4.47)
LYMPHOCYTES NFR BLD AUTO: 26 % (ref 14–44)
MAGNESIUM SERPL-MCNC: 2 MG/DL (ref 1.6–2.6)
MCH RBC QN AUTO: 30.6 PG (ref 26.8–34.3)
MCHC RBC AUTO-ENTMCNC: 31.9 G/DL (ref 31.4–37.4)
MCV RBC AUTO: 96 FL (ref 82–98)
MONOCYTES # BLD AUTO: 0.81 THOUSAND/ÂΜL (ref 0.17–1.22)
MONOCYTES NFR BLD AUTO: 14 % (ref 4–12)
NEUTROPHILS # BLD AUTO: 3.33 THOUSANDS/ÂΜL (ref 1.85–7.62)
NEUTS SEG NFR BLD AUTO: 55 % (ref 43–75)
NRBC BLD AUTO-RTO: 0 /100 WBCS
PHOSPHATE SERPL-MCNC: 2.6 MG/DL (ref 2.3–4.1)
PLATELET # BLD AUTO: 293 THOUSANDS/UL (ref 149–390)
PMV BLD AUTO: 9.5 FL (ref 8.9–12.7)
POTASSIUM SERPL-SCNC: 4.5 MMOL/L (ref 3.5–5.3)
PROT SERPL-MCNC: 7.4 G/DL (ref 6.4–8.4)
PTH-INTACT SERPL-MCNC: 61.5 PG/ML (ref 18.4–80.1)
RBC # BLD AUTO: 4.71 MILLION/UL (ref 3.88–5.62)
SODIUM SERPL-SCNC: 138 MMOL/L (ref 135–147)
T4 FREE SERPL-MCNC: 0.98 NG/DL (ref 0.76–1.46)
TSH SERPL DL<=0.05 MIU/L-ACNC: 3.39 UIU/ML (ref 0.45–4.5)
WBC # BLD AUTO: 5.93 THOUSAND/UL (ref 4.31–10.16)

## 2023-01-21 ENCOUNTER — APPOINTMENT (OUTPATIENT)
Dept: LAB | Facility: CLINIC | Age: 63
End: 2023-01-21

## 2023-01-21 DIAGNOSIS — Z94.0 KIDNEY REPLACED BY TRANSPLANT: ICD-10-CM

## 2023-01-21 LAB
BASOPHILS # BLD AUTO: 0.07 THOUSANDS/ÂΜL (ref 0–0.1)
BASOPHILS NFR BLD AUTO: 1 % (ref 0–1)
EOSINOPHIL # BLD AUTO: 0.2 THOUSAND/ÂΜL (ref 0–0.61)
EOSINOPHIL NFR BLD AUTO: 3 % (ref 0–6)
ERYTHROCYTE [DISTWIDTH] IN BLOOD BY AUTOMATED COUNT: 12.8 % (ref 11.6–15.1)
HCT VFR BLD AUTO: 44.7 % (ref 36.5–49.3)
HGB BLD-MCNC: 14.3 G/DL (ref 12–17)
IMM GRANULOCYTES # BLD AUTO: 0.03 THOUSAND/UL (ref 0–0.2)
IMM GRANULOCYTES NFR BLD AUTO: 0 % (ref 0–2)
LYMPHOCYTES # BLD AUTO: 1.65 THOUSANDS/ÂΜL (ref 0.6–4.47)
LYMPHOCYTES NFR BLD AUTO: 22 % (ref 14–44)
MCH RBC QN AUTO: 30.3 PG (ref 26.8–34.3)
MCHC RBC AUTO-ENTMCNC: 32 G/DL (ref 31.4–37.4)
MCV RBC AUTO: 95 FL (ref 82–98)
MONOCYTES # BLD AUTO: 0.87 THOUSAND/ÂΜL (ref 0.17–1.22)
MONOCYTES NFR BLD AUTO: 12 % (ref 4–12)
NEUTROPHILS # BLD AUTO: 4.66 THOUSANDS/ÂΜL (ref 1.85–7.62)
NEUTS SEG NFR BLD AUTO: 62 % (ref 43–75)
NRBC BLD AUTO-RTO: 0 /100 WBCS
PLATELET # BLD AUTO: 283 THOUSANDS/UL (ref 149–390)
PMV BLD AUTO: 9.2 FL (ref 8.9–12.7)
RBC # BLD AUTO: 4.72 MILLION/UL (ref 3.88–5.62)
WBC # BLD AUTO: 7.48 THOUSAND/UL (ref 4.31–10.16)

## 2023-01-23 LAB — TACROLIMUS BLD-MCNC: 8.5 NG/ML (ref 3–15)

## 2023-02-07 NOTE — PATIENT INSTRUCTIONS
All questions asked and answered  The patient has been instructed to call office at [phone] with any questions or concerns  Please obtain prescribed blood work and urine studies prior to next appointment   Avoid NSAID products which include:  Motrin, Advil, Ibuprofen, Aleve, Naprosyn, Naproxen, Mobic or Celebrex    Low sodium diet  Return in 6 months  Call if SBP < 100/60 and call office

## 2023-02-07 NOTE — PROGRESS NOTES
OFFICE FOLLOW UP - Nephrology   Keyon Matias 58 y o  male MRN: [id number]               ASSESSMENT and PLAN:  Kristine Steinberg was seen today for follow-up, hypertension and kidney transplant      Diagnoses and all orders for this visit:    Renal transplant recipient    Immunosuppression ([facility] )    Primary hypertension    Idiopathic gout, unspecified chronicity, unspecified site    Diabetes mellitus due to underlying condition with hyperosmolarity without coma, without long-term current use of insulin ([facility] )        That is post renal transplant  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 and again DDKT on 6/25/2013  Assessment and plan:  Etiology: Suspect secondary to type I diabetes   · Review medical records through [employer] and care everywhere baseline creatinine 1 0  · Recent creatinine 1/15/2023 0 98 with stable electrolytes and acid-base balance  · He continues to follow up every 6 months at the [address] [place] [address] and every 6 months with Dr Latosha Singh   · He is  seeing a nephrologist every 3 months with repeat blood work ordered by [facility]  · Recommend cardiovascular risk reduction measures with blood pressure control, diabetic control, lipid control  · Avoid NSAIDs nephrotoxins and IV contrast      Immunosuppression ([facility] )  Assessment and plan  · Currently on CellCept 500 mg twice daily,  tacrolimus 2 mg am and 1 5 mg pm and prednisone 5 mg daily   · Post recent tacrolimus level 8 5 on 1/23/2023  · Continues with ongoing management per his transplant center     Hypertension  Assessment and plan  · Current blood pressure stable  · Currently on hydralazine 12 5 mg 3 times daily, losartan 25 mg daily, metoprolol 25 mg twice daily  · Continue with low sodium diet no more than 2000 mg salt per day  · Recommend home blood pressure monitoring  · Reports home -120'S for the most part  · Not symptomatic  · No changes in treatment    Idiopathic gout, unspecified chronicity, unspecified site  Assessment and plan  · No gout flares at this time  · Continues on allopurinol 100 mg daily  · No change in current treatment     Diabetes mellitus due to underlying condition with hyperosmolarity without coma, without long-term current use of insulin (HCC)  Assessment and plan  · Most recent A1c 5 3  · Follows endocrinology  · Continue with ongoing glycemic control       Age related screening: Your primary caregiver may do yearly screening for colorectal cancer  It is recommended in all men and women over 48years old  You may have screening earlier if you have colon disease or a family history of colorectal cancer  Patient Instructions   • All questions asked and answered  The patient has been instructed to call office at [phone] with any questions or concerns  • Please obtain prescribed blood work and urine studies prior to next appointment   • Avoid NSAID products which include: Motrin, Advil, Ibuprofen, Aleve, Naprosyn, Naproxen, Mobic or Celebrex    • Low sodium diet  • Return in 6 months        HPI:    I had the pleasure of seeing Dioni  today in the renal clinic for the continued management of renal transplant  He was last seen 8/9/2022 with Tootie Garrido was stable from a renal standpoint, however blood pressure was on the lower side and decrease hydralazine to 12 5 mg 3 times daily  He was also seen by his transplant center [facility] via telemedicine on 10/21/2022 which note was reviewed Tootie Garrido  Since the last visit, there has been no ER visits or hospitilizations  Patient has no complaints at this time and is feeling well  Patient denies any chest pain, shortness of breath or swelling  The last blood work was done on 1/12/2023 which we have reviewed together  ROS:   Review of Systems   Constitutional: Negative  Negative for activity change, appetite change, chills, diaphoresis, fatigue and fever  HENT: Negative  Negative for congestion and facial swelling  Respiratory: Negative  Cardiovascular: Negative  Gastrointestinal: Negative  Endocrine: Negative  Genitourinary: Negative  Musculoskeletal: Negative  Skin: Negative  Allergic/Immunologic: Negative  Neurological: Negative  Hematological: Negative  Psychiatric/Behavioral: Negative  Allergies: Patient has no known allergies  Medications:   Current Outpatient Medications:   •  allopurinol (ZYLOPRIM) 100 mg tablet, Take 1 tablet by mouth daily, Disp: , Rfl:   •  Cholecalciferol 2000 units CAPS, Take 2,000 Units by mouth daily  , Disp: , Rfl:   •  hydrALAZINE (APRESOLINE) 25 mg tablet, Take 0 5 tablets (12 5 mg total) by mouth 3 (three) times a day, Disp: , Rfl:   •  insulin aspart (NovoLOG) 100 units/mL injection, Inject 100 Units under the skin As needed  varies , Disp: , Rfl:   •  levothyroxine 75 mcg tablet, Take 1 tablet by mouth daily, Disp: , Rfl:   •  losartan (COZAAR) 25 mg tablet, Take 1 tablet by mouth daily, Disp: , Rfl:   •  metoprolol tartrate (LOPRESSOR) 50 mg tablet, Take 25 mg by mouth every 12 (twelve) hours Sometimes bp gets low   Below diastolic 60 hold off 63GR in the PM and 12 5 in the AM , Disp: , Rfl:   •  Multiple Vitamin (MULTI-DAY) TABS, Take 1 tablet by mouth daily, Disp: , Rfl:   •  mycophenolate (CELLCEPT) 250 mg capsule, Take 2 capsules by mouth 2 (two) times a day, Disp: , Rfl:   •  predniSONE 5 mg tablet, Take 1 tablet by mouth daily, Disp: , Rfl:   •  rosuvastatin (CRESTOR) 10 MG tablet, Take 1 tablet (10 mg total) by mouth daily, Disp: 90 tablet, Rfl: 0  •  tacrolimus (PROGRAF) 1 mg capsule, Take 2 mg by mouth every 12 (twelve) hours Takes 2 mg in the am and 1 5 mg in the pm  , Disp: , Rfl:   •  ACCU-CHEK KAMI PLUS test strip, 6 (six) times a day Test (Patient not taking: Reported on 12/29/2021), Disp: , Rfl: 4    Past Medical History:   Diagnosis Date   • Kidney transplanted      Past Surgical History:   Procedure Laterality Date   • NEPHRECTOMY TRANSPLANTED ORGAN       Family History   Problem Relation Age of Onset   • Autoimmune disease Mother    • Hypertension Father    • Autoimmune disease Sister       reports that he has quit smoking  His smoking use included cigarettes  He has never used smokeless tobacco  He reports that he does not drink alcohol and does not use drugs  Physical Exam:   Vitals:    02/10/23 0955   BP: 108/68   BP Location: Right arm   Patient Position: Sitting   Cuff Size: Large     There is no height or weight on file to calculate BMI  Physical Exam  Vitals reviewed  Constitutional:       Appearance: Normal appearance  HENT:      Head: Normocephalic and atraumatic  Nose: Nose normal       Mouth/Throat:      Mouth: Mucous membranes are moist       Pharynx: Oropharynx is clear  Eyes:      Extraocular Movements: Extraocular movements intact  Conjunctiva/sclera: Conjunctivae normal    Cardiovascular:      Rate and Rhythm: Normal rate and regular rhythm  Pulses: Normal pulses  Heart sounds: Normal heart sounds  Pulmonary:      Effort: Pulmonary effort is normal       Breath sounds: Normal breath sounds  Abdominal:      General: Bowel sounds are normal       Palpations: Abdomen is soft  Musculoskeletal:         General: Normal range of motion  Cervical back: Normal range of motion and neck supple  Skin:     General: Skin is dry  Neurological:      General: No focal deficit present  Mental Status: He is alert and oriented to person, place, and time  Psychiatric:         Mood and Affect: Mood normal          Behavior: Behavior normal          Thought Content: Thought content normal          Judgment: Judgment normal             Lab Results:          Portions of the record may have been created with voice recognition software  Occasional wrong word or "sound a like" substitutions may have occurred due to the inherent limitations of voice recognition software   Read the chart carefully and recognize,

## 2023-02-10 ENCOUNTER — OFFICE VISIT (OUTPATIENT)
Dept: NEPHROLOGY | Facility: CLINIC | Age: 63
End: 2023-02-10

## 2023-02-10 VITALS — DIASTOLIC BLOOD PRESSURE: 68 MMHG | SYSTOLIC BLOOD PRESSURE: 108 MMHG

## 2023-02-10 DIAGNOSIS — I10 PRIMARY HYPERTENSION: ICD-10-CM

## 2023-02-10 DIAGNOSIS — Z94.0 RENAL TRANSPLANT RECIPIENT: Primary | ICD-10-CM

## 2023-02-10 DIAGNOSIS — M10.00 IDIOPATHIC GOUT, UNSPECIFIED CHRONICITY, UNSPECIFIED SITE: ICD-10-CM

## 2023-02-10 DIAGNOSIS — E08.00 DIABETES MELLITUS DUE TO UNDERLYING CONDITION WITH HYPEROSMOLARITY WITHOUT COMA, WITHOUT LONG-TERM CURRENT USE OF INSULIN (HCC): ICD-10-CM

## 2023-02-10 DIAGNOSIS — D84.9 IMMUNOSUPPRESSION (HCC): ICD-10-CM

## 2023-03-02 DIAGNOSIS — I10 HYPERTENSION, UNSPECIFIED TYPE: ICD-10-CM

## 2023-03-02 DIAGNOSIS — E08.00 DIABETES MELLITUS DUE TO UNDERLYING CONDITION WITH HYPEROSMOLARITY WITHOUT COMA, WITHOUT LONG-TERM CURRENT USE OF INSULIN (HCC): ICD-10-CM

## 2023-03-02 NOTE — TELEPHONE ENCOUNTER
Requested medication(s) are due for refill today: Yes  Patient has already received a courtesy refill: No  Other reason request has been forwarded to provider: Failed

## 2023-03-07 RX ORDER — ROSUVASTATIN CALCIUM 10 MG/1
10 TABLET, COATED ORAL DAILY
Qty: 90 TABLET | Refills: 1 | Status: SHIPPED | OUTPATIENT
Start: 2023-03-07

## 2023-03-16 LAB
LEFT EYE DIABETIC RETINOPATHY: NORMAL
RIGHT EYE DIABETIC RETINOPATHY: POSITIVE

## 2023-04-12 PROBLEM — Z00.00 PERIODIC HEALTH ASSESSMENT, GENERAL SCREENING, ADULT: Status: ACTIVE | Noted: 2023-04-12

## 2023-04-25 ENCOUNTER — TELEPHONE (OUTPATIENT)
Dept: ADMINISTRATIVE | Facility: OTHER | Age: 63
End: 2023-04-25

## 2023-04-25 NOTE — TELEPHONE ENCOUNTER
----- Message from Chance Vázquez sent at 4/25/2023 10:49 AM EDT -----  Regarding: Care Gap Request  04/25/23 10:49 AM    Hello, our patient attached above has had Diabetic Eye Exam completed/performed  Please assist in updating the patient chart by pulling the document from the Media Tab  The date of service is 3/16/23       Thank you,  Chance Vázquez  [facility]

## 2023-04-26 NOTE — TELEPHONE ENCOUNTER
Upon review of the In Basket request we were able to locate, review, and update the patient chart as requested for Diabetic Eye Exam     Any additional questions or concerns should be emailed to the Practice Liaisons via the appropriate education email address, please do not reply via In Basket      Thank you  Cathryn Hardin MA

## 2023-05-01 DIAGNOSIS — Z12.11 SCREENING FOR COLORECTAL CANCER: Primary | ICD-10-CM

## 2023-05-01 DIAGNOSIS — Z12.12 SCREENING FOR COLORECTAL CANCER: Primary | ICD-10-CM

## 2023-06-11 PROBLEM — Z00.00 PERIODIC HEALTH ASSESSMENT, GENERAL SCREENING, ADULT: Status: RESOLVED | Noted: 2023-04-12 | Resolved: 2023-06-11

## 2023-07-13 ENCOUNTER — TELEPHONE (OUTPATIENT)
Dept: NEPHROLOGY | Facility: CLINIC | Age: 63
End: 2023-07-13

## 2023-07-13 NOTE — TELEPHONE ENCOUNTER
Called pt to reschedule is 8/04/23 appt with Dr. Nicholas Harding. Pt rescheduled to 12/05/23 and put on a wait list, high priority. Labs were also faxed to pt at [phone] and emailed to [email or web address]

## 2023-07-22 ENCOUNTER — LAB (OUTPATIENT)
Dept: LAB | Facility: CLINIC | Age: 63
End: 2023-07-22
Payer: COMMERCIAL

## 2023-07-22 ENCOUNTER — TRANSCRIBE ORDERS (OUTPATIENT)
Dept: LAB | Facility: CLINIC | Age: 63
End: 2023-07-22

## 2023-07-22 DIAGNOSIS — I10 PRIMARY HYPERTENSION: ICD-10-CM

## 2023-07-22 DIAGNOSIS — E08.00 DIABETES MELLITUS DUE TO UNDERLYING CONDITION WITH HYPEROSMOLARITY WITHOUT COMA, WITHOUT LONG-TERM CURRENT USE OF INSULIN (HCC): ICD-10-CM

## 2023-07-22 DIAGNOSIS — Z94.0 KIDNEY REPLACED BY TRANSPLANT: Primary | ICD-10-CM

## 2023-07-22 DIAGNOSIS — Z94.0 RENAL TRANSPLANT RECIPIENT: ICD-10-CM

## 2023-07-22 DIAGNOSIS — Z94.0 KIDNEY REPLACED BY TRANSPLANT: ICD-10-CM

## 2023-07-22 LAB
25(OH)D3 SERPL-MCNC: 41.4 NG/ML (ref 30–100)
BACTERIA UR QL AUTO: NORMAL /HPF
BILIRUB UR QL STRIP: NEGATIVE
CHOLEST SERPL-MCNC: 155 MG/DL
CLARITY UR: CLEAR
COLOR UR: NORMAL
CREAT UR-MCNC: 40.8 MG/DL
EST. AVERAGE GLUCOSE BLD GHB EST-MCNC: 114 MG/DL
GLUCOSE UR STRIP-MCNC: NEGATIVE MG/DL
HBA1C MFR BLD: 5.6 %
HDLC SERPL-MCNC: 84 MG/DL
HGB UR QL STRIP.AUTO: NEGATIVE
KETONES UR STRIP-MCNC: NEGATIVE MG/DL
LDLC SERPL CALC-MCNC: 58 MG/DL (ref 0–100)
LEUKOCYTE ESTERASE UR QL STRIP: NEGATIVE
MAGNESIUM SERPL-MCNC: 2 MG/DL (ref 1.6–2.6)
NITRITE UR QL STRIP: NEGATIVE
NON-SQ EPI CELLS URNS QL MICRO: NORMAL /HPF
NONHDLC SERPL-MCNC: 71 MG/DL
PH UR STRIP.AUTO: 6 [PH]
PHOSPHATE SERPL-MCNC: 3.1 MG/DL (ref 2.3–4.1)
PROT UR STRIP-MCNC: NEGATIVE MG/DL
PROT UR-MCNC: <6 MG/DL
RBC #/AREA URNS AUTO: NORMAL /HPF
SP GR UR STRIP.AUTO: 1.01 (ref 1–1.03)
TRIGL SERPL-MCNC: 66 MG/DL
UROBILINOGEN UR STRIP-ACNC: <2 MG/DL
WBC #/AREA URNS AUTO: NORMAL /HPF

## 2023-07-22 PROCEDURE — 84156 ASSAY OF PROTEIN URINE: CPT

## 2023-07-22 PROCEDURE — 82306 VITAMIN D 25 HYDROXY: CPT

## 2023-07-22 PROCEDURE — 36415 COLL VENOUS BLD VENIPUNCTURE: CPT

## 2023-07-22 PROCEDURE — 83036 HEMOGLOBIN GLYCOSYLATED A1C: CPT

## 2023-07-22 PROCEDURE — 80061 LIPID PANEL: CPT

## 2023-07-22 PROCEDURE — 87799 DETECT AGENT NOS DNA QUANT: CPT

## 2023-07-22 PROCEDURE — 83735 ASSAY OF MAGNESIUM: CPT

## 2023-07-22 PROCEDURE — 81001 URINALYSIS AUTO W/SCOPE: CPT

## 2023-07-22 PROCEDURE — 82570 ASSAY OF URINE CREATININE: CPT

## 2023-07-22 PROCEDURE — 84100 ASSAY OF PHOSPHORUS: CPT

## 2023-07-24 NOTE — RESULT ENCOUNTER NOTE
Please let Matilda Woodall know that labs are stable. No concerns at this time. He has a follow-up appointment with Dr. Gilda Kumar in December. If he has any questions please let us know.

## 2023-07-25 ENCOUNTER — TELEPHONE (OUTPATIENT)
Dept: NEPHROLOGY | Facility: CLINIC | Age: 63
End: 2023-07-25

## 2023-07-25 LAB — BKV DNA, QUANT PCR, PLASMA: NEGATIVE IU/ML

## 2023-07-25 NOTE — TELEPHONE ENCOUNTER
Pt advised that recent labs are stable. Per Yonis Morgan, no changes. Pt has f/u visit in December with Dr. Rita Scanlon.        ----- Message from [address] [place] [address] sent at 7/24/2023  5:20 PM EDT -----  Please let Chay Mcdaniel know that labs are stable. No concerns at this time. He has a follow-up appointment with Dr. Rita Scanlon in December. If he has any questions please let us know.

## 2023-08-26 DIAGNOSIS — I10 HYPERTENSION, UNSPECIFIED TYPE: ICD-10-CM

## 2023-08-26 DIAGNOSIS — E08.00 DIABETES MELLITUS DUE TO UNDERLYING CONDITION WITH HYPEROSMOLARITY WITHOUT COMA, WITHOUT LONG-TERM CURRENT USE OF INSULIN (HCC): ICD-10-CM

## 2023-08-28 RX ORDER — ROSUVASTATIN CALCIUM 10 MG/1
10 TABLET, COATED ORAL DAILY
Qty: 90 TABLET | Refills: 1 | Status: SHIPPED | OUTPATIENT
Start: 2023-08-28

## 2023-10-27 ENCOUNTER — TRANSCRIBE ORDERS (OUTPATIENT)
Dept: LAB | Facility: CLINIC | Age: 63
End: 2023-10-27

## 2023-10-27 ENCOUNTER — APPOINTMENT (OUTPATIENT)
Dept: LAB | Facility: CLINIC | Age: 63
End: 2023-10-27
Payer: COMMERCIAL

## 2023-10-27 DIAGNOSIS — I51.9 MYXEDEMA HEART DISEASE: ICD-10-CM

## 2023-10-27 DIAGNOSIS — E03.9 MYXEDEMA HEART DISEASE: ICD-10-CM

## 2023-10-27 DIAGNOSIS — Z94.0 KIDNEY REPLACED BY TRANSPLANT: Primary | ICD-10-CM

## 2023-10-27 DIAGNOSIS — E87.0 TYPE I DIABETES MELLITUS WITH HYPEROSMOLAR COMA: ICD-10-CM

## 2023-10-27 DIAGNOSIS — E10.69 TYPE I DIABETES MELLITUS WITH HYPEROSMOLAR COMA: ICD-10-CM

## 2023-10-27 DIAGNOSIS — Z94.0 KIDNEY REPLACED BY TRANSPLANT: ICD-10-CM

## 2023-10-27 DIAGNOSIS — E10.69 TYPE I DIABETES MELLITUS WITH HYPEROSMOLAR COMA: Primary | ICD-10-CM

## 2023-10-27 DIAGNOSIS — E10.65 TYPE I DIABETES MELLITUS WITH HYPEROSMOLAR COMA: Primary | ICD-10-CM

## 2023-10-27 DIAGNOSIS — E10.65 TYPE I DIABETES MELLITUS WITH HYPEROSMOLAR COMA: ICD-10-CM

## 2023-10-27 DIAGNOSIS — E87.0 TYPE I DIABETES MELLITUS WITH HYPEROSMOLAR COMA: Primary | ICD-10-CM

## 2023-10-27 LAB
BACTERIA UR QL AUTO: NORMAL /HPF
BILIRUB UR QL STRIP: NEGATIVE
CLARITY UR: CLEAR
COLOR UR: NORMAL
CREAT UR-MCNC: 61.3 MG/DL
EST. AVERAGE GLUCOSE BLD GHB EST-MCNC: 131 MG/DL
GLUCOSE UR STRIP-MCNC: NEGATIVE MG/DL
HBA1C MFR BLD: 6.2 %
HGB UR QL STRIP.AUTO: NEGATIVE
KETONES UR STRIP-MCNC: NEGATIVE MG/DL
LEUKOCYTE ESTERASE UR QL STRIP: NEGATIVE
MAGNESIUM SERPL-MCNC: 1.7 MG/DL (ref 1.9–2.7)
NITRITE UR QL STRIP: NEGATIVE
NON-SQ EPI CELLS URNS QL MICRO: NORMAL /HPF
PH UR STRIP.AUTO: 6 [PH]
PHOSPHATE SERPL-MCNC: 2.8 MG/DL (ref 2.3–4.1)
PROT UR STRIP-MCNC: NEGATIVE MG/DL
PROT UR-MCNC: 5 MG/DL
PROT/CREAT UR: 0.08 MG/G{CREAT} (ref 0–0.1)
RBC #/AREA URNS AUTO: NORMAL /HPF
SP GR UR STRIP.AUTO: 1.01 (ref 1–1.03)
T4 FREE SERPL-MCNC: 1.06 NG/DL (ref 0.61–1.12)
TSH SERPL DL<=0.05 MIU/L-ACNC: 1.49 UIU/ML (ref 0.45–4.5)
UROBILINOGEN UR STRIP-ACNC: <2 MG/DL
WBC #/AREA URNS AUTO: NORMAL /HPF

## 2023-10-27 PROCEDURE — 84443 ASSAY THYROID STIM HORMONE: CPT

## 2023-10-27 PROCEDURE — 83735 ASSAY OF MAGNESIUM: CPT

## 2023-10-27 PROCEDURE — 84156 ASSAY OF PROTEIN URINE: CPT

## 2023-10-27 PROCEDURE — 81001 URINALYSIS AUTO W/SCOPE: CPT

## 2023-10-27 PROCEDURE — 84439 ASSAY OF FREE THYROXINE: CPT

## 2023-10-27 PROCEDURE — 82570 ASSAY OF URINE CREATININE: CPT

## 2023-10-27 PROCEDURE — 83036 HEMOGLOBIN GLYCOSYLATED A1C: CPT

## 2023-10-27 PROCEDURE — 84100 ASSAY OF PHOSPHORUS: CPT

## 2023-11-24 ENCOUNTER — TELEPHONE (OUTPATIENT)
Dept: NEPHROLOGY | Facility: CLINIC | Age: 63
End: 2023-11-24

## 2023-11-26 DIAGNOSIS — E08.00 DIABETES MELLITUS DUE TO UNDERLYING CONDITION WITH HYPEROSMOLARITY WITHOUT COMA, WITHOUT LONG-TERM CURRENT USE OF INSULIN (HCC): ICD-10-CM

## 2023-11-26 DIAGNOSIS — I10 HYPERTENSION, UNSPECIFIED TYPE: ICD-10-CM

## 2023-11-27 RX ORDER — ROSUVASTATIN CALCIUM 10 MG/1
10 TABLET, COATED ORAL DAILY
Qty: 90 TABLET | Refills: 1 | Status: SHIPPED | OUTPATIENT
Start: 2023-11-27

## 2023-11-27 NOTE — TELEPHONE ENCOUNTER
Pt called and stated he has not had labs done through [facility]. Last labs were done on 10/27 through [facility]. Please advise if pt will need any more labs done prior to upcoming appt on 12/5. Thank you!

## 2024-01-27 ENCOUNTER — TRANSCRIBE ORDERS (OUTPATIENT)
Dept: LAB | Facility: CLINIC | Age: 64
End: 2024-01-27

## 2024-01-27 ENCOUNTER — APPOINTMENT (OUTPATIENT)
Dept: LAB | Facility: CLINIC | Age: 64
End: 2024-01-27
Payer: COMMERCIAL

## 2024-01-27 DIAGNOSIS — Z94.0 KIDNEY REPLACED BY TRANSPLANT: ICD-10-CM

## 2024-01-27 DIAGNOSIS — Z94.0 KIDNEY REPLACED BY TRANSPLANT: Primary | ICD-10-CM

## 2024-01-27 LAB
CREAT UR-MCNC: 81.6 MG/DL
PROT UR-MCNC: 7 MG/DL
PROT/CREAT UR: 0.09 MG/G{CREAT} (ref 0–0.1)

## 2024-01-27 PROCEDURE — 84156 ASSAY OF PROTEIN URINE: CPT

## 2024-01-27 PROCEDURE — 82570 ASSAY OF URINE CREATININE: CPT

## 2024-01-30 ENCOUNTER — TELEPHONE (OUTPATIENT)
Dept: NEPHROLOGY | Facility: CLINIC | Age: 64
End: 2024-01-30

## 2024-01-30 NOTE — TELEPHONE ENCOUNTER
Pt scheduled to see Dr. Castro on 4/9/24 in the [facility]. Please add labs for pt to get done before appt.

## 2024-03-09 ENCOUNTER — TRANSCRIBE ORDERS (OUTPATIENT)
Dept: LAB | Facility: CLINIC | Age: 64
End: 2024-03-09

## 2024-03-09 ENCOUNTER — APPOINTMENT (OUTPATIENT)
Dept: LAB | Facility: CLINIC | Age: 64
End: 2024-03-09
Payer: COMMERCIAL

## 2024-03-09 DIAGNOSIS — Z94.0 KIDNEY REPLACED BY TRANSPLANT: ICD-10-CM

## 2024-03-09 DIAGNOSIS — Z94.0 KIDNEY REPLACED BY TRANSPLANT: Primary | ICD-10-CM

## 2024-03-09 LAB
EST. AVERAGE GLUCOSE BLD GHB EST-MCNC: 114 MG/DL
HBA1C MFR BLD: 5.6 %

## 2024-03-09 PROCEDURE — 83036 HEMOGLOBIN GLYCOSYLATED A1C: CPT

## 2024-04-29 LAB
LEFT EYE DIABETIC RETINOPATHY: NORMAL
RIGHT EYE DIABETIC RETINOPATHY: POSITIVE

## 2024-05-13 DIAGNOSIS — Z00.6 ENCOUNTER FOR EXAMINATION FOR NORMAL COMPARISON OR CONTROL IN CLINICAL RESEARCH PROGRAM: ICD-10-CM

## 2024-05-25 ENCOUNTER — TRANSCRIBE ORDERS (OUTPATIENT)
Dept: LAB | Facility: CLINIC | Age: 64
End: 2024-05-25

## 2024-05-25 ENCOUNTER — APPOINTMENT (OUTPATIENT)
Dept: LAB | Facility: CLINIC | Age: 64
End: 2024-05-25
Payer: COMMERCIAL

## 2024-05-25 ENCOUNTER — VBI (OUTPATIENT)
Dept: ADMINISTRATIVE | Facility: OTHER | Age: 64
End: 2024-05-25

## 2024-05-25 DIAGNOSIS — E10.69 TYPE I DIABETES MELLITUS WITH HYPEROSMOLAR COMA  (HCC): ICD-10-CM

## 2024-05-25 DIAGNOSIS — E78.5 HYPERLIPIDEMIA, UNSPECIFIED HYPERLIPIDEMIA TYPE: ICD-10-CM

## 2024-05-25 DIAGNOSIS — I10 ESSENTIAL HYPERTENSION, MALIGNANT: ICD-10-CM

## 2024-05-25 DIAGNOSIS — E10.65 TYPE I DIABETES MELLITUS WITH HYPEROSMOLAR COMA  (HCC): ICD-10-CM

## 2024-05-25 DIAGNOSIS — Z00.6 ENCOUNTER FOR EXAMINATION FOR NORMAL COMPARISON OR CONTROL IN CLINICAL RESEARCH PROGRAM: ICD-10-CM

## 2024-05-25 DIAGNOSIS — E03.9 MYXEDEMA HEART DISEASE: ICD-10-CM

## 2024-05-25 DIAGNOSIS — E10.65 TYPE I DIABETES MELLITUS WITH HYPEROSMOLAR COMA  (HCC): Primary | ICD-10-CM

## 2024-05-25 DIAGNOSIS — E87.0 TYPE I DIABETES MELLITUS WITH HYPEROSMOLAR COMA  (HCC): Primary | ICD-10-CM

## 2024-05-25 DIAGNOSIS — Z94.0 KIDNEY REPLACED BY TRANSPLANT: ICD-10-CM

## 2024-05-25 DIAGNOSIS — I51.9 MYXEDEMA HEART DISEASE: ICD-10-CM

## 2024-05-25 DIAGNOSIS — E10.69 TYPE I DIABETES MELLITUS WITH HYPEROSMOLAR COMA  (HCC): Primary | ICD-10-CM

## 2024-05-25 DIAGNOSIS — E87.0 TYPE I DIABETES MELLITUS WITH HYPEROSMOLAR COMA  (HCC): ICD-10-CM

## 2024-05-25 LAB
BILIRUB UR QL STRIP: NEGATIVE
CHOLEST SERPL-MCNC: 148 MG/DL
CLARITY UR: CLEAR
COLOR UR: NORMAL
CREAT UR-MCNC: 64.7 MG/DL
EST. AVERAGE GLUCOSE BLD GHB EST-MCNC: 117 MG/DL
GLUCOSE UR STRIP-MCNC: NEGATIVE MG/DL
HBA1C MFR BLD: 5.7 %
HDLC SERPL-MCNC: 82 MG/DL
HGB UR QL STRIP.AUTO: NEGATIVE
KETONES UR STRIP-MCNC: NEGATIVE MG/DL
LDLC SERPL CALC-MCNC: 58 MG/DL (ref 0–100)
LEUKOCYTE ESTERASE UR QL STRIP: NEGATIVE
MAGNESIUM SERPL-MCNC: 1.7 MG/DL (ref 1.9–2.7)
MICROALBUMIN UR-MCNC: <7 MG/L
MICROALBUMIN/CREAT 24H UR: <11 MG/G CREATININE (ref 0–30)
NITRITE UR QL STRIP: NEGATIVE
NONHDLC SERPL-MCNC: 66 MG/DL
PH UR STRIP.AUTO: 6 [PH]
PHOSPHATE SERPL-MCNC: 2.7 MG/DL (ref 2.3–4.1)
PROT UR STRIP-MCNC: NEGATIVE MG/DL
SP GR UR STRIP.AUTO: 1.01 (ref 1–1.03)
T4 FREE SERPL-MCNC: 0.83 NG/DL (ref 0.61–1.12)
TRIGL SERPL-MCNC: 42 MG/DL
TSH SERPL DL<=0.05 MIU/L-ACNC: 3.28 UIU/ML (ref 0.45–4.5)
URATE SERPL-MCNC: 5 MG/DL (ref 3.5–8.5)
UROBILINOGEN UR STRIP-ACNC: <2 MG/DL

## 2024-05-25 PROCEDURE — 82043 UR ALBUMIN QUANTITATIVE: CPT

## 2024-05-25 PROCEDURE — 83735 ASSAY OF MAGNESIUM: CPT

## 2024-05-25 PROCEDURE — 84443 ASSAY THYROID STIM HORMONE: CPT

## 2024-05-25 PROCEDURE — 80061 LIPID PANEL: CPT

## 2024-05-25 PROCEDURE — 84550 ASSAY OF BLOOD/URIC ACID: CPT

## 2024-05-25 PROCEDURE — 82570 ASSAY OF URINE CREATININE: CPT

## 2024-05-25 PROCEDURE — 81003 URINALYSIS AUTO W/O SCOPE: CPT

## 2024-05-25 PROCEDURE — 84439 ASSAY OF FREE THYROXINE: CPT

## 2024-05-25 PROCEDURE — 84100 ASSAY OF PHOSPHORUS: CPT

## 2024-05-25 PROCEDURE — 83036 HEMOGLOBIN GLYCOSYLATED A1C: CPT

## 2024-05-25 NOTE — TELEPHONE ENCOUNTER
Upon review of the In Basket request we were able to locate, review, and update the patient chart as requested for Diabetic Eye Exam.    Any additional questions or concerns should be emailed to the Practice Liaisons via the appropriate education email address, please do not reply via In Basket.    Thank you  Robin Dwyer MA

## 2024-06-15 LAB
APOB+LDLR+PCSK9 GENE MUT ANL BLD/T: NOT DETECTED
BRCA1+BRCA2 DEL+DUP + FULL MUT ANL BLD/T: NOT DETECTED
MLH1+MSH2+MSH6+PMS2 GN DEL+DUP+FUL M: NOT DETECTED

## 2024-06-16 DIAGNOSIS — I10 HYPERTENSION, UNSPECIFIED TYPE: ICD-10-CM

## 2024-06-16 DIAGNOSIS — E08.00 DIABETES MELLITUS DUE TO UNDERLYING CONDITION WITH HYPEROSMOLARITY WITHOUT COMA, WITHOUT LONG-TERM CURRENT USE OF INSULIN (HCC): ICD-10-CM

## 2024-06-17 RX ORDER — ROSUVASTATIN CALCIUM 10 MG/1
10 TABLET, COATED ORAL DAILY
Qty: 90 TABLET | Refills: 1 | Status: SHIPPED | OUTPATIENT
Start: 2024-06-17

## 2024-08-07 ENCOUNTER — TELEPHONE (OUTPATIENT)
Dept: NEPHROLOGY | Facility: CLINIC | Age: 64
End: 2024-08-07

## 2024-08-07 DIAGNOSIS — D84.9 IMMUNOSUPPRESSION (HCC): ICD-10-CM

## 2024-08-07 DIAGNOSIS — Z94.0 RENAL TRANSPLANT RECIPIENT: Primary | ICD-10-CM

## 2024-08-07 DIAGNOSIS — I10 PRIMARY HYPERTENSION: ICD-10-CM

## 2024-08-07 DIAGNOSIS — E08.00 DIABETES MELLITUS DUE TO UNDERLYING CONDITION WITH HYPEROSMOLARITY WITHOUT COMA, WITHOUT LONG-TERM CURRENT USE OF INSULIN (HCC): ICD-10-CM

## 2024-08-07 NOTE — TELEPHONE ENCOUNTER
----- Message from Talon Castro DO sent at 8/7/2024  2:09 PM EDT -----  Regarding: RE: Labs  Cbc, cmp, mag, phos. Pth UACR and tacrolimus level. thanks  ----- Message -----  From: Yahaira Mckenzie  Sent: 8/7/2024   9:48 AM EDT  To: Talon Castro DO  Subject: Labs                                             Does patient needs labs done prior to follow up

## 2024-08-07 NOTE — TELEPHONE ENCOUNTER
Placed call to pt and advised of lab work Dr. Castro would like prior to upcoming appointment, pt stated will have lab work completed Sat.   Labs added into Epic

## 2024-08-09 ENCOUNTER — TRANSCRIBE ORDERS (OUTPATIENT)
Dept: LAB | Facility: CLINIC | Age: 64
End: 2024-08-09

## 2024-08-09 ENCOUNTER — APPOINTMENT (OUTPATIENT)
Dept: LAB | Facility: CLINIC | Age: 64
End: 2024-08-09
Payer: COMMERCIAL

## 2024-08-09 DIAGNOSIS — Z94.1 HEART REPLACED BY TRANSPLANT (HCC): Primary | ICD-10-CM

## 2024-08-09 DIAGNOSIS — Z94.0 RENAL TRANSPLANT RECIPIENT: ICD-10-CM

## 2024-08-09 DIAGNOSIS — Z94.0 KIDNEY REPLACED BY TRANSPLANT: Primary | ICD-10-CM

## 2024-08-09 DIAGNOSIS — I10 PRIMARY HYPERTENSION: ICD-10-CM

## 2024-08-09 DIAGNOSIS — E08.00 DIABETES MELLITUS DUE TO UNDERLYING CONDITION WITH HYPEROSMOLARITY WITHOUT COMA, WITHOUT LONG-TERM CURRENT USE OF INSULIN (HCC): ICD-10-CM

## 2024-08-09 DIAGNOSIS — Z94.0 KIDNEY REPLACED BY TRANSPLANT: ICD-10-CM

## 2024-08-09 DIAGNOSIS — D84.9 IMMUNOSUPPRESSION (HCC): ICD-10-CM

## 2024-08-09 LAB
ALBUMIN SERPL BCG-MCNC: 3.8 G/DL (ref 3.5–5)
ALP SERPL-CCNC: 61 U/L (ref 34–104)
ALT SERPL W P-5'-P-CCNC: 24 U/L (ref 7–52)
ANION GAP SERPL CALCULATED.3IONS-SCNC: 7 MMOL/L (ref 4–13)
AST SERPL W P-5'-P-CCNC: 21 U/L (ref 13–39)
BACTERIA UR QL AUTO: NORMAL /HPF
BASOPHILS # BLD AUTO: 0.05 THOUSANDS/ÂΜL (ref 0–0.1)
BASOPHILS NFR BLD AUTO: 1 % (ref 0–1)
BILIRUB SERPL-MCNC: 0.71 MG/DL (ref 0.2–1)
BILIRUB UR QL STRIP: NEGATIVE
BUN SERPL-MCNC: 22 MG/DL (ref 5–25)
CALCIUM SERPL-MCNC: 9.1 MG/DL (ref 8.4–10.2)
CHLORIDE SERPL-SCNC: 103 MMOL/L (ref 96–108)
CLARITY UR: CLEAR
CO2 SERPL-SCNC: 27 MMOL/L (ref 21–32)
COLOR UR: NORMAL
CREAT SERPL-MCNC: 0.99 MG/DL (ref 0.6–1.3)
EOSINOPHIL # BLD AUTO: 0.13 THOUSAND/ÂΜL (ref 0–0.61)
EOSINOPHIL NFR BLD AUTO: 2 % (ref 0–6)
ERYTHROCYTE [DISTWIDTH] IN BLOOD BY AUTOMATED COUNT: 13.2 % (ref 11.6–15.1)
GFR SERPL CREATININE-BSD FRML MDRD: 80 ML/MIN/1.73SQ M
GLUCOSE P FAST SERPL-MCNC: 117 MG/DL (ref 65–99)
GLUCOSE UR STRIP-MCNC: NEGATIVE MG/DL
HCT VFR BLD AUTO: 46.1 % (ref 36.5–49.3)
HGB BLD-MCNC: 15 G/DL (ref 12–17)
HGB UR QL STRIP.AUTO: NEGATIVE
IMM GRANULOCYTES # BLD AUTO: 0.01 THOUSAND/UL (ref 0–0.2)
IMM GRANULOCYTES NFR BLD AUTO: 0 % (ref 0–2)
KETONES UR STRIP-MCNC: NEGATIVE MG/DL
LEUKOCYTE ESTERASE UR QL STRIP: NEGATIVE
LYMPHOCYTES # BLD AUTO: 1.5 THOUSANDS/ÂΜL (ref 0.6–4.47)
LYMPHOCYTES NFR BLD AUTO: 24 % (ref 14–44)
MAGNESIUM SERPL-MCNC: 2 MG/DL (ref 1.9–2.7)
MCH RBC QN AUTO: 32 PG (ref 26.8–34.3)
MCHC RBC AUTO-ENTMCNC: 32.5 G/DL (ref 31.4–37.4)
MCV RBC AUTO: 98 FL (ref 82–98)
MONOCYTES # BLD AUTO: 0.75 THOUSAND/ÂΜL (ref 0.17–1.22)
MONOCYTES NFR BLD AUTO: 12 % (ref 4–12)
NEUTROPHILS # BLD AUTO: 3.84 THOUSANDS/ÂΜL (ref 1.85–7.62)
NEUTS SEG NFR BLD AUTO: 61 % (ref 43–75)
NITRITE UR QL STRIP: NEGATIVE
NON-SQ EPI CELLS URNS QL MICRO: NORMAL /HPF
NRBC BLD AUTO-RTO: 0 /100 WBCS
PH UR STRIP.AUTO: 6 [PH]
PHOSPHATE SERPL-MCNC: 3 MG/DL (ref 2.3–4.1)
PLATELET # BLD AUTO: 240 THOUSANDS/UL (ref 149–390)
PMV BLD AUTO: 10.6 FL (ref 8.9–12.7)
POTASSIUM SERPL-SCNC: 4.9 MMOL/L (ref 3.5–5.3)
PROT SERPL-MCNC: 6.9 G/DL (ref 6.4–8.4)
PROT UR STRIP-MCNC: NEGATIVE MG/DL
PTH-INTACT SERPL-MCNC: 86.3 PG/ML (ref 12–88)
RBC # BLD AUTO: 4.69 MILLION/UL (ref 3.88–5.62)
RBC #/AREA URNS AUTO: NORMAL /HPF
SODIUM SERPL-SCNC: 137 MMOL/L (ref 135–147)
SP GR UR STRIP.AUTO: 1.01 (ref 1–1.03)
TACROLIMUS BLD-MCNC: 8.1 NG/ML (ref 3–15)
UROBILINOGEN UR STRIP-ACNC: <2 MG/DL
WBC # BLD AUTO: 6.28 THOUSAND/UL (ref 4.31–10.16)
WBC #/AREA URNS AUTO: NORMAL /HPF

## 2024-08-09 PROCEDURE — 83735 ASSAY OF MAGNESIUM: CPT

## 2024-08-09 PROCEDURE — 84100 ASSAY OF PHOSPHORUS: CPT

## 2024-08-09 PROCEDURE — 81001 URINALYSIS AUTO W/SCOPE: CPT

## 2024-08-09 PROCEDURE — 83970 ASSAY OF PARATHORMONE: CPT

## 2024-08-09 PROCEDURE — 80197 ASSAY OF TACROLIMUS: CPT

## 2024-08-09 PROCEDURE — 80053 COMPREHEN METABOLIC PANEL: CPT

## 2024-08-09 PROCEDURE — 85025 COMPLETE CBC W/AUTO DIFF WBC: CPT

## 2024-08-09 PROCEDURE — 36415 COLL VENOUS BLD VENIPUNCTURE: CPT

## 2024-08-12 ENCOUNTER — OFFICE VISIT (OUTPATIENT)
Dept: NEPHROLOGY | Facility: CLINIC | Age: 64
End: 2024-08-12

## 2024-08-12 VITALS
WEIGHT: 186 LBS | SYSTOLIC BLOOD PRESSURE: 122 MMHG | DIASTOLIC BLOOD PRESSURE: 62 MMHG | HEIGHT: 71 IN | BODY MASS INDEX: 26.04 KG/M2

## 2024-08-12 DIAGNOSIS — Z94.0 RENAL TRANSPLANT RECIPIENT: Primary | ICD-10-CM

## 2024-08-12 NOTE — PROGRESS NOTES
OFFICE FOLLOW UP - Nephrology   Darren Weiss 64 y.o. male MRN: [id number]    Encounter: [id number]          ASSESSMENT and PLAN:  Diagnoses and all orders for this visit:    64-year-old with a past medical history of type 1 diabetes with hypertension status post  donor renal transplant here for follow-up    Renal transplant, status post  - his creatinine remained stable between 0.8 in 1.2 an continues to do well from a transplant standpoint  - follows up every 6 months at the [facility] and every 6 months with myself so is seeing a nephrologist every 3 months with repeat blood work ordered by [facility] and recent tac adjustment  - continue cardiovascular risk reduction measures with blood pressure control, diabetic control, lipid control    Immunosuppression (HCC)  - currently on CellCept 500 mg twice daily  - tacrolimus 1.5/1 mg daily last tact was above 8 so recently changed dose repeat tac will be drawn and sent to [facility]  -prednisone 5 mg daily   -continue ongoing management     Idiopathic gout, unspecified chronicity, unspecified site  - now well controlled on allopurinol 100 mg daily will continue    Diabetes mellitus due to underlying condition with hyperosmolarity without coma, without long-term current use of insulin ([facility])  - ongoing glycemic control follows up with endocrinology regularly     hypertension  -hydralazine 12.5 mg 3 times daily, losartan 25 mg daily, metoprolol 25 mg twice daily  -is having some low blood pressures we can decrease the hydralazine to 12.5 mg 3 times daily to see if this stabilizes  -blood pressure well controlled today he does get some lower blood pressures at home decrease his hydralazine to 12.5 mg 3 times daily    Hyponatremia  -sodium has been acceptable    Continues to do well Will follow up every 6 months    HPI: Darren Weiss is a 64 y.o. male who is here for  Follow-up    Since our last visit, there has been no ER visits or hospitilizations. Patient  currently has no complaints at this time and is feeling well. Patient denies any chest pain, shortness of breath and swelling. The last blood work was done recently    Overall doing well without any complaints no cp, sob, fevers chills. No nausea or vomiting no diarrhea or constipation  Bp well controlled  Bg well controlled  No acute complaints    ROS:   All the systems were reviewed and were negative except as documented on the HPI.    Allergies: Patient has no known allergies.    Medications:   Current Outpatient Medications:     allopurinol (ZYLOPRIM) 100 mg tablet, Take 1 tablet by mouth daily, Disp: , Rfl:     hydrALAZINE (APRESOLINE) 25 mg tablet, Take 0.5 tablets (12.5 mg total) by mouth 3 (three) times a day, Disp: , Rfl:     insulin aspart (NovoLOG) 100 units/mL injection, Inject 100 Units under the skin As needed. varies , Disp: , Rfl:     levothyroxine 75 mcg tablet, Take 1 tablet by mouth daily, Disp: , Rfl:     losartan (COZAAR) 25 mg tablet, Take 1 tablet by mouth daily, Disp: , Rfl:     metoprolol tartrate (LOPRESSOR) 50 mg tablet, Take 25 mg by mouth every 12 (twelve) hours Sometimes bp gets low. Below diastolic 60 hold off 25mg in the PM and 12.5 in the AM., Disp: , Rfl:     Multiple Vitamin (MULTI-DAY) TABS, Take 1 tablet by mouth daily, Disp: , Rfl:     mycophenolate (CELLCEPT) 250 mg capsule, Take 2 capsules by mouth 2 (two) times a day, Disp: , Rfl:     predniSONE 5 mg tablet, Take 1 tablet by mouth daily, Disp: , Rfl:     rosuvastatin (CRESTOR) 10 MG tablet, TAKE 1 TABLET BY MOUTH EVERY DAY, Disp: 90 tablet, Rfl: 1    tacrolimus (PROGRAF) 0.5 mg capsule, , Disp: , Rfl:     tacrolimus (PROGRAF) 1 mg capsule, Take 2 mg by mouth every 12 (twelve) hours Now taking 1.5 mg in the morning and 1 mg in the evening, Disp: , Rfl:     ACCU-CHEK KAMI PLUS test strip, 6 (six) times a day Test (Patient not taking: Reported on 12/29/2021), Disp: , Rfl: 4    Cholecalciferol 2000 units CAPS, Take 2,000 Units  "by mouth daily  , Disp: , Rfl:     Past Medical History:   Diagnosis Date    Diabetes mellitus (HCC)     Kidney transplanted      Past Surgical History:   Procedure Laterality Date    CATARACT EXTRACTION EXTRACAPSULAR W/ INTRAOCULAR LENS IMPLANTATION Right 04/21/2023    NEPHRECTOMY TRANSPLANTED ORGAN       Family History   Problem Relation Age of Onset    Autoimmune disease Mother     Hypertension Father     Autoimmune disease Sister       reports that he has quit smoking. His smoking use included cigarettes. He has never used smokeless tobacco. He reports that he does not drink alcohol and does not use drugs.      Physical Exam:   Vitals:    08/12/24 1555   BP: 122/62   BP Location: Right arm   Patient Position: Sitting   Cuff Size: Adult   Weight: 84.4 kg (186 lb)   Height: 5' 11\" (1.803 m)     Body mass index is 25.94 kg/m².    General: conscious, cooperative, in not acute distress  Eyes: conjunctivae pink, anicteric sclerae  ENT: lips and mucous membranes moist  Neck: supple, no JVD  Chest: clear breath sounds bilateral, no crackles, ronchus or wheezings  CVS: distinct S1 & S2, normal rate, regular rhythm  Abdomen: soft, non-tender, non-distended, normoactive bowel sounds  Extremities: no edema of both legs  Skin: no rash  Neuro: awake, alert, oriented      Lab Results:    Results for orders placed or performed in visit on 08/09/24   Magnesium   Result Value Ref Range    Magnesium 2.0 1.9 - 2.7 mg/dL   Phosphorus   Result Value Ref Range    Phosphorus 3.0 2.3 - 4.1 mg/dL   PTH, intact   Result Value Ref Range    PTH 86.3 12.0 - 88.0 pg/mL   Urinalysis with microscopic   Result Value Ref Range    Color, UA Light Yellow     Clarity, UA Clear     Specific Gravity, UA 1.012 1.003 - 1.030    pH, UA 6.0 4.5, 5.0, 5.5, 6.0, 6.5, 7.0, 7.5, 8.0    Leukocytes, UA Negative Negative    Nitrite, UA Negative Negative    Protein, UA Negative Negative mg/dl    Glucose, UA Negative Negative mg/dl    Ketones, UA Negative Negative " mg/dl    Urobilinogen, UA <2.0 <2.0 mg/dl mg/dl    Bilirubin, UA Negative Negative    Occult Blood, UA Negative Negative    RBC, UA None Seen None Seen, 1-2 /hpf    WBC, UA None Seen None Seen, 1-2 /hpf    Epithelial Cells Occasional None Seen, Occasional /hpf    Bacteria, UA None Seen None Seen, Occasional /hpf   Tacrolimus level   Result Value Ref Range    TACROLIMUS 8.1 3.0 - 15.0 ng/mL   CBC and differential   Result Value Ref Range    WBC 6.28 4.31 - 10.16 Thousand/uL    RBC 4.69 3.88 - 5.62 Million/uL    Hemoglobin 15.0 12.0 - 17.0 g/dL    Hematocrit 46.1 36.5 - 49.3 %    MCV 98 82 - 98 fL    MCH 32.0 26.8 - 34.3 pg    MCHC 32.5 31.4 - 37.4 g/dL    RDW 13.2 11.6 - 15.1 %    MPV 10.6 8.9 - 12.7 fL    Platelets 240 149 - 390 Thousands/uL    nRBC 0 /100 WBCs    Segmented % 61 43 - 75 %    Immature Grans % 0 0 - 2 %    Lymphocytes % 24 14 - 44 %    Monocytes % 12 4 - 12 %    Eosinophils Relative 2 0 - 6 %    Basophils Relative 1 0 - 1 %    Absolute Neutrophils 3.84 1.85 - 7.62 Thousands/µL    Absolute Immature Grans 0.01 0.00 - 0.20 Thousand/uL    Absolute Lymphocytes 1.50 0.60 - 4.47 Thousands/µL    Absolute Monocytes 0.75 0.17 - 1.22 Thousand/µL    Eosinophils Absolute 0.13 0.00 - 0.61 Thousand/µL    Basophils Absolute 0.05 0.00 - 0.10 Thousands/µL   Comprehensive metabolic panel   Result Value Ref Range    Sodium 137 135 - 147 mmol/L    Potassium 4.9 3.5 - 5.3 mmol/L    Chloride 103 96 - 108 mmol/L    CO2 27 21 - 32 mmol/L    ANION GAP 7 4 - 13 mmol/L    BUN 22 5 - 25 mg/dL    Creatinine 0.99 0.60 - 1.30 mg/dL    Glucose, Fasting 117 (H) 65 - 99 mg/dL    Calcium 9.1 8.4 - 10.2 mg/dL    AST 21 13 - 39 U/L    ALT 24 7 - 52 U/L    Alkaline Phosphatase 61 34 - 104 U/L    Total Protein 6.9 6.4 - 8.4 g/dL    Albumin 3.8 3.5 - 5.0 g/dL    Total Bilirubin 0.71 0.20 - 1.00 mg/dL    eGFR 80 ml/min/1.73sq m     Last creatinine 1.03 last tacrolimus 6.5 from September, urine protein to creatinine ratio negative BK negative  "all other electrolytes reviewed sodium was 134 other electrolytes stable    Labs reviewed in Care everywhere    Portions of the record may have been created with voice recognition software. Occasional wrong word or \"sound a like\" substitutions may have occurred due to the inherent limitations of voice recognition software. Read the chart carefully and recognize, using context, where substitutions have occurred.If you have any questions, please contact the dictating provider.  "

## 2024-08-31 ENCOUNTER — APPOINTMENT (OUTPATIENT)
Dept: LAB | Facility: CLINIC | Age: 64
End: 2024-08-31
Payer: COMMERCIAL

## 2024-08-31 DIAGNOSIS — Z94.1 HEART REPLACED BY TRANSPLANT (HCC): ICD-10-CM

## 2024-08-31 LAB
ALBUMIN SERPL BCG-MCNC: 3.7 G/DL (ref 3.5–5)
ALP SERPL-CCNC: 59 U/L (ref 34–104)
ALT SERPL W P-5'-P-CCNC: 23 U/L (ref 7–52)
ANION GAP SERPL CALCULATED.3IONS-SCNC: 7 MMOL/L (ref 4–13)
AST SERPL W P-5'-P-CCNC: 22 U/L (ref 13–39)
BASOPHILS # BLD AUTO: 0.04 THOUSANDS/ÂΜL (ref 0–0.1)
BASOPHILS NFR BLD AUTO: 1 % (ref 0–1)
BILIRUB SERPL-MCNC: 0.72 MG/DL (ref 0.2–1)
BUN SERPL-MCNC: 17 MG/DL (ref 5–25)
CALCIUM SERPL-MCNC: 8.9 MG/DL (ref 8.4–10.2)
CHLORIDE SERPL-SCNC: 104 MMOL/L (ref 96–108)
CO2 SERPL-SCNC: 29 MMOL/L (ref 21–32)
CREAT SERPL-MCNC: 0.93 MG/DL (ref 0.6–1.3)
EOSINOPHIL # BLD AUTO: 0.17 THOUSAND/ÂΜL (ref 0–0.61)
EOSINOPHIL NFR BLD AUTO: 3 % (ref 0–6)
ERYTHROCYTE [DISTWIDTH] IN BLOOD BY AUTOMATED COUNT: 13.2 % (ref 11.6–15.1)
GFR SERPL CREATININE-BSD FRML MDRD: 86 ML/MIN/1.73SQ M
GLUCOSE P FAST SERPL-MCNC: 98 MG/DL (ref 65–99)
HCT VFR BLD AUTO: 43.2 % (ref 36.5–49.3)
HGB BLD-MCNC: 13.7 G/DL (ref 12–17)
IMM GRANULOCYTES # BLD AUTO: 0.02 THOUSAND/UL (ref 0–0.2)
IMM GRANULOCYTES NFR BLD AUTO: 0 % (ref 0–2)
LYMPHOCYTES # BLD AUTO: 1.36 THOUSANDS/ÂΜL (ref 0.6–4.47)
LYMPHOCYTES NFR BLD AUTO: 23 % (ref 14–44)
MCH RBC QN AUTO: 31.1 PG (ref 26.8–34.3)
MCHC RBC AUTO-ENTMCNC: 31.7 G/DL (ref 31.4–37.4)
MCV RBC AUTO: 98 FL (ref 82–98)
MONOCYTES # BLD AUTO: 0.83 THOUSAND/ÂΜL (ref 0.17–1.22)
MONOCYTES NFR BLD AUTO: 14 % (ref 4–12)
NEUTROPHILS # BLD AUTO: 3.43 THOUSANDS/ÂΜL (ref 1.85–7.62)
NEUTS SEG NFR BLD AUTO: 59 % (ref 43–75)
NRBC BLD AUTO-RTO: 0 /100 WBCS
PLATELET # BLD AUTO: 237 THOUSANDS/UL (ref 149–390)
PMV BLD AUTO: 11 FL (ref 8.9–12.7)
POTASSIUM SERPL-SCNC: 4.5 MMOL/L (ref 3.5–5.3)
PROT SERPL-MCNC: 6 G/DL (ref 6.4–8.4)
RBC # BLD AUTO: 4.41 MILLION/UL (ref 3.88–5.62)
SODIUM SERPL-SCNC: 140 MMOL/L (ref 135–147)
WBC # BLD AUTO: 5.85 THOUSAND/UL (ref 4.31–10.16)

## 2024-08-31 PROCEDURE — 36415 COLL VENOUS BLD VENIPUNCTURE: CPT

## 2024-08-31 PROCEDURE — 85025 COMPLETE CBC W/AUTO DIFF WBC: CPT

## 2024-08-31 PROCEDURE — 80197 ASSAY OF TACROLIMUS: CPT

## 2024-08-31 PROCEDURE — 80053 COMPREHEN METABOLIC PANEL: CPT

## 2024-09-01 LAB — TACROLIMUS BLD-MCNC: 7.1 NG/ML (ref 3–15)

## 2024-11-08 LAB — RIGHT EYE DIABETIC RETINOPATHY: POSITIVE

## 2024-12-10 DIAGNOSIS — E08.00 DIABETES MELLITUS DUE TO UNDERLYING CONDITION WITH HYPEROSMOLARITY WITHOUT COMA, WITHOUT LONG-TERM CURRENT USE OF INSULIN (HCC): ICD-10-CM

## 2024-12-10 DIAGNOSIS — I10 HYPERTENSION, UNSPECIFIED TYPE: ICD-10-CM

## 2024-12-11 RX ORDER — ROSUVASTATIN CALCIUM 10 MG/1
10 TABLET, COATED ORAL DAILY
Qty: 90 TABLET | Refills: 1 | Status: SHIPPED | OUTPATIENT
Start: 2024-12-11

## 2024-12-21 ENCOUNTER — APPOINTMENT (OUTPATIENT)
Dept: LAB | Facility: CLINIC | Age: 64
End: 2024-12-21
Payer: COMMERCIAL

## 2024-12-21 ENCOUNTER — TRANSCRIBE ORDERS (OUTPATIENT)
Dept: LAB | Facility: CLINIC | Age: 64
End: 2024-12-21

## 2024-12-21 DIAGNOSIS — Z94.0 KIDNEY REPLACED BY TRANSPLANT: Primary | ICD-10-CM

## 2024-12-21 DIAGNOSIS — E03.9 MYXEDEMA HEART DISEASE: ICD-10-CM

## 2024-12-21 DIAGNOSIS — E10.22 CONTROLLED TYPE 1 DIABETES MELLITUS WITH CHRONIC KIDNEY DISEASE, UNSPECIFIED CKD STAGE (HCC): ICD-10-CM

## 2024-12-21 DIAGNOSIS — E10.8 TYPE I DIABETES MELLITUS WITH MANIFESTATIONS (HCC): ICD-10-CM

## 2024-12-21 DIAGNOSIS — Z94.0 KIDNEY REPLACED BY TRANSPLANT: ICD-10-CM

## 2024-12-21 DIAGNOSIS — E03.9 MYXEDEMA HEART DISEASE: Primary | ICD-10-CM

## 2024-12-21 DIAGNOSIS — I51.9 MYXEDEMA HEART DISEASE: ICD-10-CM

## 2024-12-21 DIAGNOSIS — I51.9 MYXEDEMA HEART DISEASE: Primary | ICD-10-CM

## 2024-12-21 LAB
ALBUMIN SERPL BCG-MCNC: 3.9 G/DL (ref 3.5–5)
ALP SERPL-CCNC: 82 U/L (ref 34–104)
ALT SERPL W P-5'-P-CCNC: 26 U/L (ref 7–52)
ANION GAP SERPL CALCULATED.3IONS-SCNC: 6 MMOL/L (ref 4–13)
AST SERPL W P-5'-P-CCNC: 25 U/L (ref 13–39)
BACTERIA UR QL AUTO: ABNORMAL /HPF
BASOPHILS # BLD AUTO: 0.06 THOUSANDS/ÂΜL (ref 0–0.1)
BASOPHILS NFR BLD AUTO: 1 % (ref 0–1)
BILIRUB SERPL-MCNC: 0.56 MG/DL (ref 0.2–1)
BILIRUB UR QL STRIP: NEGATIVE
BUN SERPL-MCNC: 21 MG/DL (ref 5–25)
CALCIUM SERPL-MCNC: 9.3 MG/DL (ref 8.4–10.2)
CHLORIDE SERPL-SCNC: 104 MMOL/L (ref 96–108)
CLARITY UR: CLEAR
CO2 SERPL-SCNC: 28 MMOL/L (ref 21–32)
COLOR UR: YELLOW
CREAT SERPL-MCNC: 0.93 MG/DL (ref 0.6–1.3)
EOSINOPHIL # BLD AUTO: 0.15 THOUSAND/ÂΜL (ref 0–0.61)
EOSINOPHIL NFR BLD AUTO: 2 % (ref 0–6)
ERYTHROCYTE [DISTWIDTH] IN BLOOD BY AUTOMATED COUNT: 12.8 % (ref 11.6–15.1)
EST. AVERAGE GLUCOSE BLD GHB EST-MCNC: 137 MG/DL
GFR SERPL CREATININE-BSD FRML MDRD: 86 ML/MIN/1.73SQ M
GLUCOSE P FAST SERPL-MCNC: 134 MG/DL (ref 65–99)
GLUCOSE UR STRIP-MCNC: NEGATIVE MG/DL
HBA1C MFR BLD: 6.4 %
HCT VFR BLD AUTO: 48.1 % (ref 36.5–49.3)
HGB BLD-MCNC: 14.9 G/DL (ref 12–17)
HGB UR QL STRIP.AUTO: NEGATIVE
IMM GRANULOCYTES # BLD AUTO: 0.04 THOUSAND/UL (ref 0–0.2)
IMM GRANULOCYTES NFR BLD AUTO: 1 % (ref 0–2)
KETONES UR STRIP-MCNC: NEGATIVE MG/DL
LEUKOCYTE ESTERASE UR QL STRIP: NEGATIVE
LYMPHOCYTES # BLD AUTO: 1.68 THOUSANDS/ÂΜL (ref 0.6–4.47)
LYMPHOCYTES NFR BLD AUTO: 23 % (ref 14–44)
MAGNESIUM SERPL-MCNC: 2.1 MG/DL (ref 1.9–2.7)
MCH RBC QN AUTO: 30.2 PG (ref 26.8–34.3)
MCHC RBC AUTO-ENTMCNC: 31 G/DL (ref 31.4–37.4)
MCV RBC AUTO: 98 FL (ref 82–98)
MONOCYTES # BLD AUTO: 0.73 THOUSAND/ÂΜL (ref 0.17–1.22)
MONOCYTES NFR BLD AUTO: 10 % (ref 4–12)
NEUTROPHILS # BLD AUTO: 4.76 THOUSANDS/ÂΜL (ref 1.85–7.62)
NEUTS SEG NFR BLD AUTO: 63 % (ref 43–75)
NITRITE UR QL STRIP: NEGATIVE
NON-SQ EPI CELLS URNS QL MICRO: ABNORMAL /HPF
NRBC BLD AUTO-RTO: 0 /100 WBCS
PH UR STRIP.AUTO: 6.5 [PH]
PHOSPHATE SERPL-MCNC: 3 MG/DL (ref 2.3–4.1)
PLATELET # BLD AUTO: 295 THOUSANDS/UL (ref 149–390)
PMV BLD AUTO: 10.2 FL (ref 8.9–12.7)
POTASSIUM SERPL-SCNC: 4.4 MMOL/L (ref 3.5–5.3)
PROT SERPL-MCNC: 6.9 G/DL (ref 6.4–8.4)
PROT UR STRIP-MCNC: ABNORMAL MG/DL
RBC # BLD AUTO: 4.93 MILLION/UL (ref 3.88–5.62)
RBC #/AREA URNS AUTO: ABNORMAL /HPF
SODIUM SERPL-SCNC: 138 MMOL/L (ref 135–147)
SP GR UR STRIP.AUTO: 1.02 (ref 1–1.03)
T4 FREE SERPL-MCNC: 0.91 NG/DL (ref 0.61–1.12)
TSH SERPL DL<=0.05 MIU/L-ACNC: 4.69 UIU/ML (ref 0.45–4.5)
UROBILINOGEN UR STRIP-ACNC: <2 MG/DL
WBC # BLD AUTO: 7.42 THOUSAND/UL (ref 4.31–10.16)
WBC #/AREA URNS AUTO: ABNORMAL /HPF

## 2024-12-21 PROCEDURE — 83036 HEMOGLOBIN GLYCOSYLATED A1C: CPT

## 2024-12-21 PROCEDURE — 83735 ASSAY OF MAGNESIUM: CPT

## 2024-12-21 PROCEDURE — 80053 COMPREHEN METABOLIC PANEL: CPT

## 2024-12-21 PROCEDURE — 85025 COMPLETE CBC W/AUTO DIFF WBC: CPT

## 2024-12-21 PROCEDURE — 81001 URINALYSIS AUTO W/SCOPE: CPT

## 2024-12-21 PROCEDURE — 84443 ASSAY THYROID STIM HORMONE: CPT

## 2024-12-21 PROCEDURE — 80197 ASSAY OF TACROLIMUS: CPT

## 2024-12-21 PROCEDURE — 84439 ASSAY OF FREE THYROXINE: CPT

## 2024-12-21 PROCEDURE — 84100 ASSAY OF PHOSPHORUS: CPT

## 2024-12-21 PROCEDURE — 36415 COLL VENOUS BLD VENIPUNCTURE: CPT

## 2024-12-22 LAB — TACROLIMUS BLD-MCNC: 7.1 NG/ML (ref 3–15)

## 2025-01-28 ENCOUNTER — OFFICE VISIT (OUTPATIENT)
Dept: NEPHROLOGY | Facility: CLINIC | Age: 65
End: 2025-01-28
Payer: COMMERCIAL

## 2025-01-28 VITALS
SYSTOLIC BLOOD PRESSURE: 122 MMHG | HEIGHT: 71 IN | BODY MASS INDEX: 29.82 KG/M2 | WEIGHT: 213 LBS | DIASTOLIC BLOOD PRESSURE: 74 MMHG

## 2025-01-28 DIAGNOSIS — E08.00 DIABETES MELLITUS DUE TO UNDERLYING CONDITION WITH HYPEROSMOLARITY WITHOUT COMA, WITHOUT LONG-TERM CURRENT USE OF INSULIN (HCC): ICD-10-CM

## 2025-01-28 DIAGNOSIS — I10 PRIMARY HYPERTENSION: ICD-10-CM

## 2025-01-28 DIAGNOSIS — D84.9 IMMUNOSUPPRESSION (HCC): ICD-10-CM

## 2025-01-28 DIAGNOSIS — Z94.0 RENAL TRANSPLANT RECIPIENT: Primary | ICD-10-CM

## 2025-01-28 DIAGNOSIS — M10.00 IDIOPATHIC GOUT, UNSPECIFIED CHRONICITY, UNSPECIFIED SITE: ICD-10-CM

## 2025-01-28 DIAGNOSIS — E11.9 DIABETES MELLITUS (HCC): ICD-10-CM

## 2025-01-28 PROCEDURE — 99214 OFFICE O/P EST MOD 30 MIN: CPT | Performed by: INTERNAL MEDICINE

## 2025-01-28 RX ORDER — TACROLIMUS 1 MG/1
CAPSULE ORAL
Start: 2025-01-28

## 2025-01-28 NOTE — ASSESSMENT & PLAN NOTE
Tacrolimus 1.5 mg a.m./1 mg p.m.  Mycophenolate 500 mg p.m./500 mg p.m. complicated by diarrhea which resolves with Imodium

## 2025-01-28 NOTE — ASSESSMENT & PLAN NOTE
Blood pressure well-controlled on hydralazine 12.5 mg 3 times daily  Metoprolol tartrate 25 mg every 12 hours losartan 25 mg daily

## 2025-01-28 NOTE — PROGRESS NOTES
Name: Darren Weiss      : 1960      MRN: [id number]  Encounter Provider: Talon Castro DO  Encounter Date: 2025   Encounter department: [facility] [place]  :  Assessment & Plan  Renal transplant recipient  Patient's baseline creatinine is 0.8-1.2  Continue semiannual follow-up and routine follow-up with [facility]  Continue cardiovascular risk reduction measures  Continue immunosuppressive therapy  Yearly dermatology appointments  Routine cancer screenings  Renal transplant secondary to type 1 diabetes,  donor renal transplant in  that was complicated by chronic rejection and then subsequently had a second  donor kidney transplant on 2013    Orders:    tacrolimus (PROGRAF) 1 mg capsule; 1.5 mg in AM in 1 mg in PM    Immunosuppression (HCC)  Tacrolimus 1.5 mg a.m./1 mg p.m.  Mycophenolate 500 mg p.m./500 mg p.m. complicated by diarrhea which resolves with Imodium       Primary hypertension  Blood pressure well-controlled on hydralazine 12.5 mg 3 times daily  Metoprolol tartrate 25 mg every 12 hours losartan 25 mg daily         Diabetes mellitus (HCC)  Hemoglobin A1c is slightly elevated from previous  Continue insulin pump and endocrinology follow-up  Lab Results   Component Value Date    HGBA1C 6.4 (H) 2024            Idiopathic gout, unspecified chronicity, unspecified site  Remains on allopurinol 100 mg daily continue to monitor uric acid       Diabetes mellitus due to underlying condition with hyperosmolarity without coma, without long-term current use of insulin (HCC)  Has some complications of diabetic retinopathy and diabetic neuropathy  Continue routine diabetic care  Lab Results   Component Value Date    HGBA1C 6.4 (H) 2024              Patient Instructions   Darren,    You are here today for follow-up regarding your kidney function.  From a transplant standpoint your labs look stable.  Your tacrolimus level was slightly high and your  tacrolimus dose was adjusted in December.  We will see what the repeat blood work shows and collaborate with [facility] on making any changes if needed.  Your blood pressures are at goal currently.  You can continue your current medications.  We will follow-up in 6 months if there are any concerns or needs in terms of your insurance we will be happy to see you more frequently.    Sincerely,    [facility]      History of Present Illness   HPI  Darren Weiss is a 64 y.o. male who presents for evaluation.  He currently is doing well, denies any acute chest pain or shortness of breath.  He recently had to quit working as he has significant peripheral vision loss, he denies any acute chest pain or shortness of breath fevers or chills no nausea vomiting diarrhea constipation no foamy or bloody urine.  He is tolerating his medications without difficulty  History obtained from: patient    Review of Systems  Medical History Reviewed by provider this encounter:     .  Past Medical History   Past Medical History:   Diagnosis Date    Diabetes mellitus (HCC)     Kidney transplanted      Past Surgical History:   Procedure Laterality Date    CATARACT EXTRACTION EXTRACAPSULAR W/ INTRAOCULAR LENS IMPLANTATION Right 04/21/2023    NEPHRECTOMY TRANSPLANTED ORGAN       Family History   Problem Relation Age of Onset    Autoimmune disease Mother     Hypertension Father     Autoimmune disease Sister       reports that he has quit smoking. His smoking use included cigarettes. He has never used smokeless tobacco. He reports that he does not drink alcohol and does not use drugs.  Current Outpatient Medications on File Prior to Visit   Medication Sig Dispense Refill    allopurinol (ZYLOPRIM) 100 mg tablet Take 1 tablet by mouth daily      Cholecalciferol 2000 units CAPS Take 2,000 Units by mouth daily        hydrALAZINE (APRESOLINE) 25 mg tablet Take 0.5 tablets (12.5 mg total) by mouth 3 (three) times a day      insulin  aspart (NovoLOG) 100 units/mL injection Inject 100 Units under the skin As needed. varies       levothyroxine 75 mcg tablet Take 1 tablet by mouth daily      losartan (COZAAR) 25 mg tablet Take 1 tablet by mouth daily      metoprolol tartrate (LOPRESSOR) 50 mg tablet Take 25 mg by mouth every 12 (twelve) hours Sometimes bp gets low. Below diastolic 60 hold off  25mg in the PM and 12.5 in the AM.      Multiple Vitamin (MULTI-DAY) TABS Take 1 tablet by mouth daily      mycophenolate (CELLCEPT) 250 mg capsule Take 2 capsules by mouth 2 (two) times a day      predniSONE 5 mg tablet Take 1 tablet by mouth daily      rosuvastatin (CRESTOR) 10 MG tablet TAKE 1 TABLET BY MOUTH EVERY DAY 90 tablet 1    [DISCONTINUED] tacrolimus (PROGRAF) 0.5 mg capsule       [DISCONTINUED] tacrolimus (PROGRAF) 1 mg capsule Take 2 mg by mouth every 12 (twelve) hours Now taking 1.5 mg in the morning and 1 mg in the evening      ACCU-CHEK KAMI PLUS test strip 6 (six) times a day Test (Patient not taking: Reported on 12/29/2021)  4     No current facility-administered medications on file prior to visit.   No Known Allergies   Current Outpatient Medications on File Prior to Visit   Medication Sig Dispense Refill    allopurinol (ZYLOPRIM) 100 mg tablet Take 1 tablet by mouth daily      Cholecalciferol 2000 units CAPS Take 2,000 Units by mouth daily        hydrALAZINE (APRESOLINE) 25 mg tablet Take 0.5 tablets (12.5 mg total) by mouth 3 (three) times a day      insulin aspart (NovoLOG) 100 units/mL injection Inject 100 Units under the skin As needed. varies       levothyroxine 75 mcg tablet Take 1 tablet by mouth daily      losartan (COZAAR) 25 mg tablet Take 1 tablet by mouth daily      metoprolol tartrate (LOPRESSOR) 50 mg tablet Take 25 mg by mouth every 12 (twelve) hours Sometimes bp gets low. Below diastolic 60 hold off  25mg in the PM and 12.5 in the AM.      Multiple Vitamin (MULTI-DAY) TABS Take 1 tablet by mouth daily      mycophenolate  "(CELLCEPT) 250 mg capsule Take 2 capsules by mouth 2 (two) times a day      predniSONE 5 mg tablet Take 1 tablet by mouth daily      rosuvastatin (CRESTOR) 10 MG tablet TAKE 1 TABLET BY MOUTH EVERY DAY 90 tablet 1    [DISCONTINUED] tacrolimus (PROGRAF) 0.5 mg capsule       [DISCONTINUED] tacrolimus (PROGRAF) 1 mg capsule Take 2 mg by mouth every 12 (twelve) hours Now taking 1.5 mg in the morning and 1 mg in the evening      ACCU-CHEK KAMI PLUS test strip 6 (six) times a day Test (Patient not taking: Reported on 12/29/2021)  4     No current facility-administered medications on file prior to visit.      Social History     Tobacco Use    Smoking status: Former     Types: Cigarettes    Smokeless tobacco: Never    Tobacco comments:     smoked 4 yrs in 1970's   Vaping Use    Vaping status: Never Used   Substance and Sexual Activity    Alcohol use: No    Drug use: No    Sexual activity: Yes        Objective   /74 (BP Location: Right arm, Patient Position: Sitting, Cuff Size: Standard)   Ht 5' 11\" (1.803 m)   Wt 96.6 kg (213 lb)   BMI 29.71 kg/m²      Physical Exam  Vitals and nursing note reviewed.   Constitutional:       General: He is not in acute distress.     Appearance: He is well-developed.   HENT:      Head: Normocephalic and atraumatic.   Eyes:      Conjunctiva/sclera: Conjunctivae normal.   Cardiovascular:      Rate and Rhythm: Normal rate and regular rhythm.      Heart sounds: No murmur heard.  Pulmonary:      Effort: Pulmonary effort is normal. No respiratory distress.      Breath sounds: Normal breath sounds.   Abdominal:      Palpations: Abdomen is soft.      Tenderness: There is no abdominal tenderness.   Musculoskeletal:         General: No swelling.      Cervical back: Neck supple.   Skin:     General: Skin is warm and dry.      Capillary Refill: Capillary refill takes less than 2 seconds.   Neurological:      Mental Status: He is alert.   Psychiatric:         Mood and Affect: Mood normal. "

## 2025-01-28 NOTE — ASSESSMENT & PLAN NOTE
Patient's baseline creatinine is 0.8-1.2  Continue semiannual follow-up and routine follow-up with [place]  Continue cardiovascular risk reduction measures  Continue immunosuppressive therapy  Yearly dermatology appointments  Routine cancer screenings  Renal transplant secondary to type 1 diabetes,  donor renal transplant in  that was complicated by chronic rejection and then subsequently had a second  donor kidney transplant on 2013    Orders:    tacrolimus (PROGRAF) 1 mg capsule; 1.5 mg in AM in 1 mg in PM

## 2025-01-28 NOTE — ASSESSMENT & PLAN NOTE
Hemoglobin A1c is slightly elevated from previous  Continue insulin pump and endocrinology follow-up  Lab Results   Component Value Date    HGBA1C 6.4 (H) 12/21/2024

## 2025-01-28 NOTE — PATIENT INSTRUCTIONS
Darren,    You are here today for follow-up regarding your kidney function.  From a transplant standpoint your labs look stable.  Your tacrolimus level was slightly high and your tacrolimus dose was adjusted in December.  We will see what the repeat blood work shows and collaborate with Bertin on making any changes if needed.  Your blood pressures are at goal currently.  You can continue your current medications.  We will follow-up in 6 months if there are any concerns or needs in terms of your insurance we will be happy to see you more frequently.    Sincerely,    [facility]

## 2025-01-28 NOTE — ASSESSMENT & PLAN NOTE
Has some complications of diabetic retinopathy and diabetic neuropathy  Continue routine diabetic care  Lab Results   Component Value Date    HGBA1C 6.4 (H) 12/21/2024

## 2025-02-15 ENCOUNTER — APPOINTMENT (OUTPATIENT)
Dept: LAB | Facility: CLINIC | Age: 65
End: 2025-02-15
Payer: COMMERCIAL

## 2025-02-15 ENCOUNTER — TRANSCRIBE ORDERS (OUTPATIENT)
Dept: LAB | Facility: CLINIC | Age: 65
End: 2025-02-15

## 2025-02-15 DIAGNOSIS — E10.311 TYPE I DIABETES MELLITUS WITH MACULAR EDEMA (HCC): ICD-10-CM

## 2025-02-15 DIAGNOSIS — E10.69 TYPE I DIABETES MELLITUS WITH HYPEROSMOLAR COMA (HCC): Primary | ICD-10-CM

## 2025-02-15 DIAGNOSIS — E10.65 TYPE I DIABETES MELLITUS WITH HYPEROSMOLAR COMA (HCC): ICD-10-CM

## 2025-02-15 DIAGNOSIS — E10.65 TYPE I DIABETES MELLITUS WITH HYPEROSMOLAR COMA (HCC): Primary | ICD-10-CM

## 2025-02-15 DIAGNOSIS — E87.0 TYPE I DIABETES MELLITUS WITH HYPEROSMOLAR COMA (HCC): Primary | ICD-10-CM

## 2025-02-15 DIAGNOSIS — Z94.0 RENAL TRANSPLANT RECIPIENT: ICD-10-CM

## 2025-02-15 DIAGNOSIS — Z94.0 KIDNEY REPLACED BY TRANSPLANT: ICD-10-CM

## 2025-02-15 DIAGNOSIS — E10.69 TYPE I DIABETES MELLITUS WITH HYPEROSMOLAR COMA (HCC): ICD-10-CM

## 2025-02-15 DIAGNOSIS — Z94.0 KIDNEY REPLACED BY TRANSPLANT: Primary | ICD-10-CM

## 2025-02-15 DIAGNOSIS — E87.0 TYPE I DIABETES MELLITUS WITH HYPEROSMOLAR COMA (HCC): ICD-10-CM

## 2025-02-15 DIAGNOSIS — Z94.0 KIDNEY TRANSPLANTED: ICD-10-CM

## 2025-02-15 DIAGNOSIS — E10.22 CONTROLLED TYPE 1 DIABETES MELLITUS WITH CHRONIC KIDNEY DISEASE, UNSPECIFIED CKD STAGE (HCC): ICD-10-CM

## 2025-02-15 LAB
ALBUMIN SERPL BCG-MCNC: 3.8 G/DL (ref 3.5–5)
ALP SERPL-CCNC: 71 U/L (ref 34–104)
ALT SERPL W P-5'-P-CCNC: 25 U/L (ref 7–52)
ANION GAP SERPL CALCULATED.3IONS-SCNC: 5 MMOL/L (ref 4–13)
AST SERPL W P-5'-P-CCNC: 27 U/L (ref 13–39)
BACTERIA UR QL AUTO: ABNORMAL /HPF
BASOPHILS # BLD AUTO: 0.06 THOUSANDS/ΜL (ref 0–0.1)
BASOPHILS NFR BLD AUTO: 1 % (ref 0–1)
BILIRUB SERPL-MCNC: 0.59 MG/DL (ref 0.2–1)
BILIRUB UR QL STRIP: NEGATIVE
BUN SERPL-MCNC: 17 MG/DL (ref 5–25)
CALCIUM SERPL-MCNC: 9.1 MG/DL (ref 8.4–10.2)
CHLORIDE SERPL-SCNC: 104 MMOL/L (ref 96–108)
CLARITY UR: CLEAR
CO2 SERPL-SCNC: 30 MMOL/L (ref 21–32)
COLOR UR: YELLOW
CREAT SERPL-MCNC: 0.97 MG/DL (ref 0.6–1.3)
CREAT UR-MCNC: 135.5 MG/DL
EOSINOPHIL # BLD AUTO: 0.16 THOUSAND/ΜL (ref 0–0.61)
EOSINOPHIL NFR BLD AUTO: 2 % (ref 0–6)
ERYTHROCYTE [DISTWIDTH] IN BLOOD BY AUTOMATED COUNT: 13.6 % (ref 11.6–15.1)
EST. AVERAGE GLUCOSE BLD GHB EST-MCNC: 126 MG/DL
GFR SERPL CREATININE-BSD FRML MDRD: 82 ML/MIN/1.73SQ M
GLUCOSE P FAST SERPL-MCNC: 105 MG/DL (ref 65–99)
GLUCOSE UR STRIP-MCNC: NEGATIVE MG/DL
HBA1C MFR BLD: 6 %
HCT VFR BLD AUTO: 44 % (ref 36.5–49.3)
HGB BLD-MCNC: 14 G/DL (ref 12–17)
HGB UR QL STRIP.AUTO: NEGATIVE
IMM GRANULOCYTES # BLD AUTO: 0.02 THOUSAND/UL (ref 0–0.2)
IMM GRANULOCYTES NFR BLD AUTO: 0 % (ref 0–2)
KETONES UR STRIP-MCNC: NEGATIVE MG/DL
LEUKOCYTE ESTERASE UR QL STRIP: NEGATIVE
LYMPHOCYTES # BLD AUTO: 2.11 THOUSANDS/ΜL (ref 0.6–4.47)
LYMPHOCYTES NFR BLD AUTO: 32 % (ref 14–44)
MAGNESIUM SERPL-MCNC: 1.8 MG/DL (ref 1.9–2.7)
MCH RBC QN AUTO: 30.6 PG (ref 26.8–34.3)
MCHC RBC AUTO-ENTMCNC: 31.8 G/DL (ref 31.4–37.4)
MCV RBC AUTO: 96 FL (ref 82–98)
MICROALBUMIN UR-MCNC: 12.1 MG/L
MICROALBUMIN/CREAT 24H UR: 9 MG/G CREATININE (ref 0–30)
MONOCYTES # BLD AUTO: 0.75 THOUSAND/ΜL (ref 0.17–1.22)
MONOCYTES NFR BLD AUTO: 11 % (ref 4–12)
NEUTROPHILS # BLD AUTO: 3.52 THOUSANDS/ΜL (ref 1.85–7.62)
NEUTS SEG NFR BLD AUTO: 54 % (ref 43–75)
NITRITE UR QL STRIP: NEGATIVE
NON-SQ EPI CELLS URNS QL MICRO: ABNORMAL /HPF
NRBC BLD AUTO-RTO: 0 /100 WBCS
PH UR STRIP.AUTO: 6 [PH]
PHOSPHATE SERPL-MCNC: 3 MG/DL (ref 2.3–4.1)
PLATELET # BLD AUTO: 244 THOUSANDS/UL (ref 149–390)
PMV BLD AUTO: 10 FL (ref 8.9–12.7)
POTASSIUM SERPL-SCNC: 4.2 MMOL/L (ref 3.5–5.3)
PROT SERPL-MCNC: 6.6 G/DL (ref 6.4–8.4)
PROT UR STRIP-MCNC: ABNORMAL MG/DL
RBC # BLD AUTO: 4.58 MILLION/UL (ref 3.88–5.62)
RBC #/AREA URNS AUTO: ABNORMAL /HPF
SODIUM SERPL-SCNC: 139 MMOL/L (ref 135–147)
SP GR UR STRIP.AUTO: 1.02 (ref 1–1.03)
T4 FREE SERPL-MCNC: 1.01 NG/DL (ref 0.61–1.12)
TSH SERPL DL<=0.05 MIU/L-ACNC: 4.33 UIU/ML (ref 0.45–4.5)
UROBILINOGEN UR STRIP-ACNC: <2 MG/DL
WBC # BLD AUTO: 6.62 THOUSAND/UL (ref 4.31–10.16)
WBC #/AREA URNS AUTO: ABNORMAL /HPF

## 2025-02-15 PROCEDURE — 84443 ASSAY THYROID STIM HORMONE: CPT

## 2025-02-15 PROCEDURE — 82043 UR ALBUMIN QUANTITATIVE: CPT

## 2025-02-15 PROCEDURE — 83036 HEMOGLOBIN GLYCOSYLATED A1C: CPT

## 2025-02-15 PROCEDURE — 81001 URINALYSIS AUTO W/SCOPE: CPT

## 2025-02-15 PROCEDURE — 80053 COMPREHEN METABOLIC PANEL: CPT

## 2025-02-15 PROCEDURE — 84439 ASSAY OF FREE THYROXINE: CPT

## 2025-02-15 PROCEDURE — 36415 COLL VENOUS BLD VENIPUNCTURE: CPT

## 2025-02-15 PROCEDURE — 82570 ASSAY OF URINE CREATININE: CPT

## 2025-02-15 PROCEDURE — 83735 ASSAY OF MAGNESIUM: CPT

## 2025-02-15 PROCEDURE — 80197 ASSAY OF TACROLIMUS: CPT

## 2025-02-15 PROCEDURE — 85025 COMPLETE CBC W/AUTO DIFF WBC: CPT

## 2025-02-15 PROCEDURE — 84100 ASSAY OF PHOSPHORUS: CPT

## 2025-02-16 LAB — TACROLIMUS BLD-MCNC: 7.6 NG/ML (ref 3–15)

## 2025-02-18 ENCOUNTER — RESULTS FOLLOW-UP (OUTPATIENT)
Dept: NEPHROLOGY | Facility: CLINIC | Age: 65
End: 2025-02-18

## 2025-02-18 DIAGNOSIS — I10 PRIMARY HYPERTENSION: Primary | ICD-10-CM

## 2025-02-18 DIAGNOSIS — Z94.0 RENAL TRANSPLANT RECIPIENT: ICD-10-CM

## 2025-02-18 NOTE — TELEPHONE ENCOUNTER
Spoke to pt regarding the following message      Talon Castro, DO   Can you let Tomás know that his labs are stable.  Also can you confirm if I am taking over adjusting and ordering his immunosuppression or is he continuing with [facility]?  Thanks     Pt verbalized understanding. Pt stated he will continue adjustments and refills with Dr. Castro and if anything changes in the future he will give us a call.

## 2025-02-20 ENCOUNTER — OFFICE VISIT (OUTPATIENT)
Dept: CARDIOLOGY CLINIC | Facility: CLINIC | Age: 65
End: 2025-02-20
Payer: COMMERCIAL

## 2025-02-20 VITALS
HEART RATE: 67 BPM | WEIGHT: 199 LBS | BODY MASS INDEX: 27.75 KG/M2 | OXYGEN SATURATION: 96 % | DIASTOLIC BLOOD PRESSURE: 60 MMHG | SYSTOLIC BLOOD PRESSURE: 126 MMHG

## 2025-02-20 DIAGNOSIS — E78.2 MIXED HYPERLIPIDEMIA: ICD-10-CM

## 2025-02-20 DIAGNOSIS — I10 PRIMARY HYPERTENSION: Primary | ICD-10-CM

## 2025-02-20 DIAGNOSIS — E08.00 DIABETES MELLITUS DUE TO UNDERLYING CONDITION WITH HYPEROSMOLARITY WITHOUT COMA, WITHOUT LONG-TERM CURRENT USE OF INSULIN (HCC): ICD-10-CM

## 2025-02-20 DIAGNOSIS — I65.29 STENOSIS OF CAROTID ARTERY, UNSPECIFIED LATERALITY: ICD-10-CM

## 2025-02-20 DIAGNOSIS — Z94.0 RENAL TRANSPLANT RECIPIENT: ICD-10-CM

## 2025-02-20 PROCEDURE — 99214 OFFICE O/P EST MOD 30 MIN: CPT | Performed by: STUDENT IN AN ORGANIZED HEALTH CARE EDUCATION/TRAINING PROGRAM

## 2025-02-20 NOTE — PROGRESS NOTES
[facility] [place]  [address]  [place] [address]  Phone#  [phone]  Fax#  [phone]  [facility]'s Cardiology Office Follow-up Visit             NAME: Darren Weiss  AGE: 64 y.o. SEX: male   : 1960   MRN: [id number]    DATE: 2025  TIME: 8:49 AM      Assessment & Plan  Primary hypertension  Stable.  Continue losartan, hydralazine, metoprolol as prescribed.  No changes in doses today.  Diabetes mellitus due to underlying condition with hyperosmolarity without coma, without long-term current use of insulin (HCC)  Stable.  Working on titrating his insulin pump with help of endocrinology  Mixed hyperlipidemia  Stable.  Last lipid panel 2024.  Total cholesterol 148, triglycerides 42, HDL 82, LDL 58.  Continue rosuvastatin 10 mg daily  Ordered lipid panel to be obtained before follow-up visit  Stenosis of carotid artery, unspecified laterality  Stable.  Continue rosuvastatin as above  Renal transplant recipient  Stable.  Continues to follow-up with [facility]    Systolic murmur, 2 out of 6, not new  This was reported on prior office notes from 2019.  Patient reports he has a longstanding history of heart murmur.  Review of echocardiogram did not be significant valvular abnormalities, although he did have some sclerosis of his aortic valve.  Will continue to closely monitor and repeat echocardiogram if murmur intensifies or if he develops any symptoms.    Follow up 1 year       -----    Chief Complaint   Patient presents with    Follow-up       HPI:    Darren Weiss is a 64 y.o.-year-old male who presents to the cardiology clinic for follow up for the above-listed problems.     Past medical history significant for asymptomatic PAD and carotid stenosis, HTN, HLD, insulin-dependent diabetes, and renal transplant ~.     Today, he reports feeling well.  Denies chest pain, chest discomfort, shortness of breath at rest, dyspnea on exertion, lightheadedness, dizziness.   He reports that he is following regularly with [facility] regarding his renal transplant.  He is currently working on potentially down titrating his tacrolimus.  Otherwise, he denies any cardiac symptoms today presents mainly to reestablish care with our clinic.    He has been taking his medications as prescribed including his metoprolol tartrate 25 mg twice daily as well as hydralazine 12.5 mg every 8 hours, losartan 25 mg once daily.      BP today 126/60  BPs at home 110/52 - 130/68    Insulin pump.    -----    I personally reviewed the patient's pertinent cardiac imaging and labs in Epic:    11/29/18 echo - LVEF 65%, grade 2 diastolic dysfunction  Labs reviewed below    -----    Past history, family history, social history, current medications, vital signs, recent lab and imaging studies and  prior cardiology studies reviewed independently on this visit.    No Known Allergies    Current Outpatient Medications:     allopurinol (ZYLOPRIM) 100 mg tablet, Take 1 tablet by mouth daily, Disp: , Rfl:     Cholecalciferol 2000 units CAPS, Take 2,000 Units by mouth daily  , Disp: , Rfl:     hydrALAZINE (APRESOLINE) 25 mg tablet, Take 0.5 tablets (12.5 mg total) by mouth 3 (three) times a day, Disp: , Rfl:     insulin aspart (NovoLOG) 100 units/mL injection, Inject 100 Units under the skin As needed. varies , Disp: , Rfl:     levothyroxine 75 mcg tablet, Take 1 tablet by mouth daily, Disp: , Rfl:     losartan (COZAAR) 25 mg tablet, Take 1 tablet by mouth daily, Disp: , Rfl:     metoprolol tartrate (LOPRESSOR) 50 mg tablet, Take 25 mg by mouth every 12 (twelve) hours Sometimes bp gets low. Below diastolic 60 hold off 25mg in the PM and 12.5 in the AM., Disp: , Rfl:     Multiple Vitamin (MULTI-DAY) TABS, Take 1 tablet by mouth daily, Disp: , Rfl:     mycophenolate (CELLCEPT) 250 mg capsule, Take 2 capsules by mouth 2 (two) times a day, Disp: , Rfl:     predniSONE 5 mg tablet, Take 1 tablet by mouth daily, Disp: , Rfl:      rosuvastatin (CRESTOR) 10 MG tablet, TAKE 1 TABLET BY MOUTH EVERY DAY, Disp: 90 tablet, Rfl: 1    tacrolimus (PROGRAF) 1 mg capsule, 1.5 mg in AM in 1 mg in PM, Disp: , Rfl:     Review of Systems   Constitutional:  Negative for fatigue.   Respiratory:  Negative for chest tightness and shortness of breath.    Cardiovascular:  Negative for chest pain, palpitations and leg swelling.   Neurological:  Negative for dizziness and light-headedness.       Objective:     Vitals:    02/20/25 1554   BP: 126/60   Pulse: 67   SpO2: 96%     Wt Readings from Last 3 Encounters:   02/20/25 90.3 kg (199 lb)   01/28/25 96.6 kg (213 lb)   08/12/24 84.4 kg (186 lb)     Pulse Readings from Last 3 Encounters:   02/20/25 67   04/12/23 63   04/09/23 77     BP Readings from Last 3 Encounters:   02/20/25 126/60   01/28/25 122/74   08/12/24 122/62     Physical Exam  Vitals reviewed.   Constitutional:       General: He is not in acute distress.     Appearance: Normal appearance. He is well-developed.   HENT:      Head: Normocephalic and atraumatic.   Cardiovascular:      Rate and Rhythm: Normal rate and regular rhythm.      Heart sounds: Murmur heard.      Systolic murmur is present with a grade of 2/6.   Pulmonary:      Effort: Pulmonary effort is normal. No respiratory distress.      Breath sounds: Normal breath sounds.   Abdominal:      General: There is no distension.   Musculoskeletal:         General: No swelling.      Cervical back: Neck supple.   Skin:     General: Skin is warm and dry.   Neurological:      Mental Status: He is alert.   Psychiatric:         Mood and Affect: Mood normal.           Pertinent Laboratory/Diagnostic Studies:    Laboratory studies reviewed personally by Prachi Norton MD    BMP:   Lab Results   Component Value Date    SODIUM 139 02/15/2025    K 4.2 02/15/2025     02/15/2025    CO2 30 02/15/2025    BUN 17 02/15/2025    CREATININE 0.97 02/15/2025    GLUC 69 (L) 10/15/2022    CALCIUM 9.1 02/15/2025  "    CBC:  Lab Results   Component Value Date    WBC 6.62 02/15/2025    HGB 14.0 02/15/2025    HCT 44.0 02/15/2025    MCV 96 02/15/2025     02/15/2025     Lipid Profile:   Lab Results   Component Value Date    HDL 82 05/25/2024     Lab Results   Component Value Date    LDLCALC 58 05/25/2024     Lab Results   Component Value Date    TRIG 42 05/25/2024      Other labs:  Lab Results   Component Value Date    EKB4NEAAELGK 4.327 02/15/2025    TSH 4.06 04/09/2022     Lab Results   Component Value Date    ALT 25 02/15/2025    AST 27 02/15/2025     Lab Results   Component Value Date    HGBA1C 6.0 (H) 02/15/2025         Imaging Studies:     Pertinent imaging studies and cardiac studies were independently reviewed on this visit and findings summarized.    Prachi Norton MD, PhD    Portions of the record may have been created with voice recognition software.  Occasional wrong word or \"sound alike\" substitutions may have occurred due to the inherent limitations of voice recognition software.  Read the chart carefully and recognize, using context, where substitutions have occurred. Please reach out to me directly for any clarifications.   "

## 2025-02-20 NOTE — TELEPHONE ENCOUNTER
Spoke to pt regarding the following message     Talon Castro, DO   Ok could you have him repeat a tacolimus trough level again with bmp in 1 week.  His goal tacrolimus level will be 5-7 normally 7.1 last two but 7.6 on last blood if higher then 7.5 on repeat tacrolimus trough will likely decrease his tac dose but wa  nt to confirm consistant elevation Thanks     Pt verbalized understanding.

## 2025-02-21 PROCEDURE — 93000 ELECTROCARDIOGRAM COMPLETE: CPT | Performed by: STUDENT IN AN ORGANIZED HEALTH CARE EDUCATION/TRAINING PROGRAM

## 2025-03-08 ENCOUNTER — APPOINTMENT (OUTPATIENT)
Dept: LAB | Facility: CLINIC | Age: 65
End: 2025-03-08
Payer: COMMERCIAL

## 2025-03-08 DIAGNOSIS — I10 PRIMARY HYPERTENSION: ICD-10-CM

## 2025-03-08 DIAGNOSIS — Z94.0 RENAL TRANSPLANT RECIPIENT: ICD-10-CM

## 2025-03-08 DIAGNOSIS — E78.2 MIXED HYPERLIPIDEMIA: ICD-10-CM

## 2025-03-08 PROCEDURE — 36415 COLL VENOUS BLD VENIPUNCTURE: CPT

## 2025-03-08 PROCEDURE — 80197 ASSAY OF TACROLIMUS: CPT

## 2025-03-09 LAB — TACROLIMUS BLD-MCNC: 9.1 NG/ML (ref 3–15)

## 2025-03-10 ENCOUNTER — RESULTS FOLLOW-UP (OUTPATIENT)
Dept: NEPHROLOGY | Facility: CLINIC | Age: 65
End: 2025-03-10

## 2025-03-10 DIAGNOSIS — I10 HYPERTENSION, UNSPECIFIED TYPE: ICD-10-CM

## 2025-03-10 DIAGNOSIS — Z94.0 RENAL TRANSPLANT RECIPIENT: Primary | ICD-10-CM

## 2025-03-10 NOTE — TELEPHONE ENCOUNTER
Blood work placed for pt.     ----- Message from Talon Castro DO sent at 3/10/2025  9:04 AM EDT -----  Spoke with patient, tacrolimus level now up to 9.1 will decrease tacrolimus to 1 mg in AM and 1 mg in PM and repeat tacrolimus trough by end of week.      Nephrology team please place order for tacrolimus for Friday or later.  thanks

## 2025-03-15 ENCOUNTER — APPOINTMENT (OUTPATIENT)
Dept: LAB | Facility: CLINIC | Age: 65
End: 2025-03-15
Payer: COMMERCIAL

## 2025-03-15 DIAGNOSIS — Z94.0 RENAL TRANSPLANT RECIPIENT: ICD-10-CM

## 2025-03-15 DIAGNOSIS — I10 HYPERTENSION, UNSPECIFIED TYPE: ICD-10-CM

## 2025-03-15 PROCEDURE — 36415 COLL VENOUS BLD VENIPUNCTURE: CPT

## 2025-03-15 PROCEDURE — 80197 ASSAY OF TACROLIMUS: CPT

## 2025-03-16 LAB — TACROLIMUS BLD-MCNC: 4.8 NG/ML (ref 3–15)

## 2025-03-17 DIAGNOSIS — Z94.0 RENAL TRANSPLANT RECIPIENT: ICD-10-CM

## 2025-03-17 RX ORDER — TACROLIMUS 1 MG/1
CAPSULE ORAL
Start: 2025-03-17

## 2025-03-18 ENCOUNTER — RESULTS FOLLOW-UP (OUTPATIENT)
Dept: NEPHROLOGY | Facility: CLINIC | Age: 65
End: 2025-03-18

## 2025-03-18 DIAGNOSIS — I10 PRIMARY HYPERTENSION: ICD-10-CM

## 2025-03-18 DIAGNOSIS — E08.00 DIABETES MELLITUS DUE TO UNDERLYING CONDITION WITH HYPEROSMOLARITY WITHOUT COMA, WITHOUT LONG-TERM CURRENT USE OF INSULIN (HCC): Primary | ICD-10-CM

## 2025-03-18 NOTE — TELEPHONE ENCOUNTER
----- Message from Talon Castro DO sent at 3/17/2025  1:15 PM EDT -----  Tacrolimus trough much improved now at 4.8.  Currently on 1 mg in the morning and 1 mg in the evening.  Will continue this current dosing.  Please put in for a repeat tacrolimus trough and BMP in 2 weeks thank you

## 2025-03-18 NOTE — TELEPHONE ENCOUNTER
Called patient and went over the following information:    Tacrolimus trough much improved now at 4.8.  Currently on 1 mg in the morning and 1 mg in the evening.  Will continue this current dosing.  Please put in for a repeat tacrolimus trough and BMP in 2 weeks.    Patient verbally understood and had no further questions for me at this time.

## 2025-04-04 ENCOUNTER — APPOINTMENT (OUTPATIENT)
Dept: LAB | Facility: CLINIC | Age: 65
End: 2025-04-04
Payer: COMMERCIAL

## 2025-04-04 DIAGNOSIS — E08.00 DIABETES MELLITUS DUE TO UNDERLYING CONDITION WITH HYPEROSMOLARITY WITHOUT COMA, WITHOUT LONG-TERM CURRENT USE OF INSULIN (HCC): ICD-10-CM

## 2025-04-04 DIAGNOSIS — Z94.0 KIDNEY REPLACED BY TRANSPLANT: ICD-10-CM

## 2025-04-04 DIAGNOSIS — I10 PRIMARY HYPERTENSION: ICD-10-CM

## 2025-04-04 LAB
ALBUMIN SERPL BCG-MCNC: 4.1 G/DL (ref 3.5–5)
ALP SERPL-CCNC: 63 U/L (ref 34–104)
ALT SERPL W P-5'-P-CCNC: 26 U/L (ref 7–52)
ANION GAP SERPL CALCULATED.3IONS-SCNC: 8 MMOL/L (ref 4–13)
AST SERPL W P-5'-P-CCNC: 24 U/L (ref 13–39)
BASOPHILS # BLD AUTO: 0.05 THOUSANDS/ÂΜL (ref 0–0.1)
BASOPHILS NFR BLD AUTO: 1 % (ref 0–1)
BILIRUB SERPL-MCNC: 0.7 MG/DL (ref 0.2–1)
BUN SERPL-MCNC: 15 MG/DL (ref 5–25)
CALCIUM SERPL-MCNC: 9.3 MG/DL (ref 8.4–10.2)
CHLORIDE SERPL-SCNC: 104 MMOL/L (ref 96–108)
CO2 SERPL-SCNC: 27 MMOL/L (ref 21–32)
CREAT SERPL-MCNC: 0.92 MG/DL (ref 0.6–1.3)
EOSINOPHIL # BLD AUTO: 0.14 THOUSAND/ÂΜL (ref 0–0.61)
EOSINOPHIL NFR BLD AUTO: 2 % (ref 0–6)
ERYTHROCYTE [DISTWIDTH] IN BLOOD BY AUTOMATED COUNT: 13.5 % (ref 11.6–15.1)
GFR SERPL CREATININE-BSD FRML MDRD: 87 ML/MIN/1.73SQ M
GLUCOSE P FAST SERPL-MCNC: 122 MG/DL (ref 65–99)
HCT VFR BLD AUTO: 45.7 % (ref 36.5–49.3)
HGB BLD-MCNC: 14.5 G/DL (ref 12–17)
IMM GRANULOCYTES # BLD AUTO: 0.01 THOUSAND/UL (ref 0–0.2)
IMM GRANULOCYTES NFR BLD AUTO: 0 % (ref 0–2)
LYMPHOCYTES # BLD AUTO: 1.77 THOUSANDS/ÂΜL (ref 0.6–4.47)
LYMPHOCYTES NFR BLD AUTO: 28 % (ref 14–44)
MCH RBC QN AUTO: 30.7 PG (ref 26.8–34.3)
MCHC RBC AUTO-ENTMCNC: 31.7 G/DL (ref 31.4–37.4)
MCV RBC AUTO: 97 FL (ref 82–98)
MONOCYTES # BLD AUTO: 0.81 THOUSAND/ÂΜL (ref 0.17–1.22)
MONOCYTES NFR BLD AUTO: 13 % (ref 4–12)
NEUTROPHILS # BLD AUTO: 3.48 THOUSANDS/ÂΜL (ref 1.85–7.62)
NEUTS SEG NFR BLD AUTO: 56 % (ref 43–75)
NRBC BLD AUTO-RTO: 0 /100 WBCS
PLATELET # BLD AUTO: 249 THOUSANDS/UL (ref 149–390)
PMV BLD AUTO: 10.3 FL (ref 8.9–12.7)
POTASSIUM SERPL-SCNC: 4.7 MMOL/L (ref 3.5–5.3)
PROT SERPL-MCNC: 6.9 G/DL (ref 6.4–8.4)
RBC # BLD AUTO: 4.73 MILLION/UL (ref 3.88–5.62)
SODIUM SERPL-SCNC: 139 MMOL/L (ref 135–147)
WBC # BLD AUTO: 6.26 THOUSAND/UL (ref 4.31–10.16)

## 2025-04-04 PROCEDURE — 80053 COMPREHEN METABOLIC PANEL: CPT

## 2025-04-04 PROCEDURE — 80197 ASSAY OF TACROLIMUS: CPT

## 2025-04-04 PROCEDURE — 85025 COMPLETE CBC W/AUTO DIFF WBC: CPT

## 2025-04-05 LAB — TACROLIMUS BLD-MCNC: 6.7 NG/ML (ref 3–15)

## 2025-04-07 ENCOUNTER — RESULTS FOLLOW-UP (OUTPATIENT)
Dept: OTHER | Facility: HOSPITAL | Age: 65
End: 2025-04-07

## 2025-04-07 NOTE — TELEPHONE ENCOUNTER
Patient calling back, relayed the above message and he expressed understanding.  No further action needed

## 2025-04-07 NOTE — RESULT ENCOUNTER NOTE
His last tacrolimus level is 6.7 would like this to be if between 4-6 original note says it was 9.1 but resolved from April 4 shows at 6.7.   we can continue tacrolimus 1 mg in the morning but decrease his tacrolimus to 0.5 mg in the evening and repeat a CBC CMP and tacrolimus trough at the end of this week thank you

## 2025-04-08 ENCOUNTER — OFFICE VISIT (OUTPATIENT)
Dept: FAMILY MEDICINE CLINIC | Facility: CLINIC | Age: 65
End: 2025-04-08
Payer: COMMERCIAL

## 2025-04-08 ENCOUNTER — TELEPHONE (OUTPATIENT)
Dept: NEPHROLOGY | Facility: CLINIC | Age: 65
End: 2025-04-08

## 2025-04-08 VITALS
HEART RATE: 63 BPM | DIASTOLIC BLOOD PRESSURE: 86 MMHG | RESPIRATION RATE: 18 BRPM | OXYGEN SATURATION: 98 % | TEMPERATURE: 97.8 F | BODY MASS INDEX: 28.67 KG/M2 | WEIGHT: 204.8 LBS | SYSTOLIC BLOOD PRESSURE: 140 MMHG | HEIGHT: 71 IN

## 2025-04-08 DIAGNOSIS — M79.642 BILATERAL HAND PAIN: ICD-10-CM

## 2025-04-08 DIAGNOSIS — M79.641 BILATERAL HAND PAIN: ICD-10-CM

## 2025-04-08 DIAGNOSIS — M20.42 HAMMERTOES OF BOTH FEET: ICD-10-CM

## 2025-04-08 DIAGNOSIS — E08.319: ICD-10-CM

## 2025-04-08 DIAGNOSIS — Z94.0 RENAL TRANSPLANT RECIPIENT: ICD-10-CM

## 2025-04-08 DIAGNOSIS — E08.00 DIABETES MELLITUS DUE TO UNDERLYING CONDITION WITH HYPEROSMOLARITY WITHOUT COMA, WITHOUT LONG-TERM CURRENT USE OF INSULIN (HCC): ICD-10-CM

## 2025-04-08 DIAGNOSIS — Z13.5 ENCOUNTER FOR SCREENING FOR DIABETIC RETINOPATHY: Primary | ICD-10-CM

## 2025-04-08 DIAGNOSIS — I10 PRIMARY HYPERTENSION: ICD-10-CM

## 2025-04-08 DIAGNOSIS — M20.41 HAMMERTOES OF BOTH FEET: ICD-10-CM

## 2025-04-08 PROBLEM — E11.319 DIABETIC RETINOPATHY (HCC): Status: ACTIVE | Noted: 2025-04-08

## 2025-04-08 PROCEDURE — 99214 OFFICE O/P EST MOD 30 MIN: CPT | Performed by: FAMILY MEDICINE

## 2025-04-08 NOTE — ASSESSMENT & PLAN NOTE
History of renal transplant.  Patient continues to take immunosuppressive medications and is followed by his nephrology team for his history of renal transplant

## 2025-04-08 NOTE — ASSESSMENT & PLAN NOTE
Ca.  Patient continues to see podiatrist for his history of hammertoes and foot pains due to chronic condition

## 2025-04-08 NOTE — ASSESSMENT & PLAN NOTE
Hypertension.  Patient is followed by cardiologist for monitoring of chronic condition.  Fortunately blood pressure has been stable at this time with current regimen of medications

## 2025-04-08 NOTE — TELEPHONE ENCOUNTER
Pt had 8/25/25 appt with Dr. Castro that was rescheduled to 8/19/25 in the [facility]. Provider had a schedule change.

## 2025-04-08 NOTE — PROGRESS NOTES
FAMILY PRACTICE OFFICE VISIT       NAME: Darren Weiss  AGE: 64 y.o. SEX: male       : 1960        MRN: [id number]    DATE: 2025  TIME: 4:10 PM    Assessment and Plan     Problem List Items Addressed This Visit       Diabetes mellitus due to underlying condition with hyperosmolarity without coma, without long-term current use of insulin (HCC)    Diabetes patient with type 1 diabetes and uses an insulin pump to try and regulate his blood sugars.  He will continue to see his endocrinologist for management of chronic condition  Lab Results   Component Value Date    HGBA1C 6.0 (H) 02/15/2025            Hypertension    Hypertension.  Patient is followed by cardiologist for monitoring of chronic condition.  Fortunately blood pressure has been stable at this time with current regimen of medications         Renal transplant recipient    History of renal transplant.  Patient continues to take immunosuppressive medications and is followed by his nephrology team for his history of renal transplant         Hammertoes of both feet    Hammertoes.  Patient continues to see podiatrist for his history of hammertoes and foot pains due to chronic condition         Diabetic retinopathy ([facility])    Diabetic retinopathy.  Patient continues to see has retinal specialist for his history of diabetic retinopathy that required photocoagulation.  Patient has total blindness of left eye.  Lab Results   Component Value Date    HGBA1C 6.0 (H) 02/15/2025             Other Visit Diagnoses         Encounter for screening for diabetic retinopathy    -  Primary      Bilateral hand pain        Relevant Orders    XR hand 3+ vw right    XR hand 3+ vw left                Chief Complaint     Chief Complaint   Patient presents with    Apply for Disability    Left hand pain       History of Present Illness     Patient in the office to review chronic medical conditions.  He continues to see his endocrinologist,  for his history of type 1  diabetes.  Patient uses insulin pump.  Last A1c was stable at 6.0.  Patient sees his nephrologist for his history of renal transplant which was performed in the 1985 as well as a repeat transplant in 2013.  Patient sees his ophthalmologist for his history of left eye blindness and photocoagulation therapy of his right eye due to diabetes.  Patient sees  at [place] [facility].    Patient also reports seeing podiatrist for his history of hammertoes on bilateral feet which causes foot pains and difficulties with standing and walking for extended periods of time.    Overall the patient felt that he could no longer work at his current place of employment without risking significant injury risk due to his multiple symptoms and disabilities.  Patient applied for Social Security benefits but will also be applying for permanent disability status.          Review of Systems   Review of Systems   Constitutional:  Positive for fatigue.   Eyes:  Positive for visual disturbance.   Respiratory: Negative.     Cardiovascular: Negative.    Gastrointestinal: Negative.    Genitourinary: Negative.    Musculoskeletal:  Positive for arthralgias and gait problem.   Psychiatric/Behavioral: Negative.         Active Problem List     Patient Active Problem List   Diagnosis    Diabetes mellitus due to underlying condition with hyperosmolarity without coma, without long-term current use of insulin (HCC)    Gout    Hypertension    Immunosuppression (HCC)    Renal transplant recipient    Carotid stenosis    Hammertoes of both feet    Diabetic retinopathy (HCC)       Past Medical History:  Past Medical History:   Diagnosis Date    Diabetes mellitus (HCC)     Kidney transplanted        Past Surgical History:  Past Surgical History:   Procedure Laterality Date    CATARACT EXTRACTION EXTRACAPSULAR W/ INTRAOCULAR LENS IMPLANTATION Right 04/21/2023    NEPHRECTOMY TRANSPLANTED ORGAN         Family History:  Family History   Problem Relation  Age of Onset    Autoimmune disease Mother     Hypertension Father     Autoimmune disease Sister        Social History:  Social History     Socioeconomic History    Marital status: /Civil Union     Spouse name: Not on file    Number of children: Not on file    Years of education: Not on file    Highest education level: Not on file   Occupational History    Not on file   Tobacco Use    Smoking status: Former     Types: Cigarettes    Smokeless tobacco: Never    Tobacco comments:     smoked 4 yrs in 1970's   Vaping Use    Vaping status: Never Used   Substance and Sexual Activity    Alcohol use: No    Drug use: No    Sexual activity: Yes   Other Topics Concern    Not on file   Social History Narrative    Not on file     Social Drivers of Health     Financial Resource Strain: Low Risk  (4/11/2022)    [facility]    Overall Financial Resource Strain (CARDIA)     Difficulty of Paying Living Expenses: Not hard at all   Food Insecurity: No Food Insecurity (4/11/2022)    [facility]    Hunger Vital Sign     Worried About Running Out of Food in the Last Year: Never true     Ran Out of Food in the Last Year: Never true   Transportation Needs: No Transportation Needs (4/11/2022)    [facility]    PRAPARE - Transportation     Lack of Transportation (Medical): No     Lack of Transportation (Non-Medical): No   Physical Activity: Sufficiently Active (4/11/2022)    [facility]    Exercise Vital Sign     Days of Exercise per Week: 5 days     Minutes of Exercise per Session: 130 min   Stress: Not on file (11/4/2024)   Social Connections: Not on file   Intimate Partner Violence: Low Risk   (4/13/2021)    [facility]    Intimate Partner Violence     Insults You: Not on file     Threatens You: Not on file     Screams at You: Not on file     Physically Hurt: Not on file     Intimate Partner Violence Score: Not on file   Housing Stability: Low Risk  (4/11/2022)    [facility]    Housing Stability Vital Sign     Unable to Pay for Housing in the Last Year: No     Number of Places Lived in the Last Year: 1     Unstable Housing in the Last Year: No       Objective     Vitals:    04/08/25 1350   BP: 140/86   Pulse: 63   Resp: 18   Temp: 97.8 °F (36.6 °C)   SpO2: 98%     Wt Readings from Last 3 Encounters:   04/08/25 92.9 kg (204 lb 12.8 oz)   02/20/25 90.3 kg (199 lb)   01/28/25 96.6 kg (213 lb)       Physical Exam  Constitutional:       General: He is not in acute distress.     Appearance: Normal appearance. He is not ill-appearing.   HENT:      Head: Normocephalic and atraumatic.      Right Ear: Tympanic membrane, ear canal and external ear normal. There is no impacted cerumen.      Left Ear: Tympanic membrane, ear canal and external ear normal. There is no impacted cerumen.   Eyes:      General:         Right eye: No discharge.         Left eye: No discharge.      Extraocular Movements: Extraocular movements intact.      Conjunctiva/sclera: Conjunctivae normal.      Pupils: Pupils are equal, round, and reactive to light.   Neck:      Vascular: No carotid bruit.   Cardiovascular:      Rate and Rhythm: Normal rate and regular rhythm.      Heart sounds: Normal heart sounds. No murmur heard.  Pulmonary:      Effort: Pulmonary effort is normal.      Breath sounds: Normal breath sounds. No wheezing, rhonchi or rales.   Abdominal:      General: Abdomen is flat. Bowel sounds are normal. There is no distension.      Palpations: Abdomen is soft.      Tenderness: There is no abdominal tenderness.  "There is no guarding or rebound.   Musculoskeletal:         General: Deformity present.      Right lower leg: No edema.      Left lower leg: No edema.      Comments: Patient with hammertoes of second toe of bilateral feet.  Patient requires significant bandaging and support so as to not cause excess pain in his shoes and boots.    Muscle strength +5/5 of upper and lower extremities.  Normal range of motion of extremities.   Lymphadenopathy:      Cervical: No cervical adenopathy.   Skin:     Findings: No rash.   Neurological:      General: No focal deficit present.      Mental Status: He is alert and oriented to person, place, and time.      Cranial Nerves: No cranial nerve deficit.   Psychiatric:         Mood and Affect: Mood normal.         Behavior: Behavior normal.         Thought Content: Thought content normal.         Judgment: Judgment normal.         Pertinent Laboratory/Diagnostic Studies:  Lab Results   Component Value Date    BUN 15 04/04/2025    CREATININE 0.92 04/04/2025    CALCIUM 9.3 04/04/2025    K 4.7 04/04/2025    CO2 27 04/04/2025     04/04/2025     Lab Results   Component Value Date    ALT 26 04/04/2025    AST 24 04/04/2025    ALKPHOS 63 04/04/2025       Lab Results   Component Value Date    WBC 6.26 04/04/2025    HGB 14.5 04/04/2025    HCT 45.7 04/04/2025    MCV 97 04/04/2025     04/04/2025       Lab Results   Component Value Date    TSH 4.06 04/09/2022       No results found for: \"CHOL\"  Lab Results   Component Value Date    TRIG 42 05/25/2024     Lab Results   Component Value Date    HDL 82 05/25/2024     Lab Results   Component Value Date    LDLCALC 58 05/25/2024     Lab Results   Component Value Date    HGBA1C 6.0 (H) 02/15/2025       Results for orders placed or performed in visit on 04/04/25   Tacrolimus level   Result Value Ref Range    TACROLIMUS 6.7 3.0 - 15.0 ng/mL   CBC and differential   Result Value Ref Range    WBC 6.26 4.31 - 10.16 Thousand/uL    RBC 4.73 3.88 - 5.62 " Million/uL    Hemoglobin 14.5 12.0 - 17.0 g/dL    Hematocrit 45.7 36.5 - 49.3 %    MCV 97 82 - 98 fL    MCH 30.7 26.8 - 34.3 pg    MCHC 31.7 31.4 - 37.4 g/dL    RDW 13.5 11.6 - 15.1 %    MPV 10.3 8.9 - 12.7 fL    Platelets 249 149 - 390 Thousands/uL    nRBC 0 /100 WBCs    Segmented % 56 43 - 75 %    Immature Grans % 0 0 - 2 %    Lymphocytes % 28 14 - 44 %    Monocytes % 13 (H) 4 - 12 %    Eosinophils Relative 2 0 - 6 %    Basophils Relative 1 0 - 1 %    Absolute Neutrophils 3.48 1.85 - 7.62 Thousands/µL    Absolute Immature Grans 0.01 0.00 - 0.20 Thousand/uL    Absolute Lymphocytes 1.77 0.60 - 4.47 Thousands/µL    Absolute Monocytes 0.81 0.17 - 1.22 Thousand/µL    Eosinophils Absolute 0.14 0.00 - 0.61 Thousand/µL    Basophils Absolute 0.05 0.00 - 0.10 Thousands/µL       Orders Placed This Encounter   Procedures    XR hand 3+ vw right    XR hand 3+ vw left       ALLERGIES:  No Known Allergies    Current Medications     Current Outpatient Medications   Medication Sig Dispense Refill    allopurinol (ZYLOPRIM) 100 mg tablet Take 1 tablet by mouth daily      Cholecalciferol 2000 units CAPS Take 2,000 Units by mouth daily        hydrALAZINE (APRESOLINE) 25 mg tablet Take 0.5 tablets (12.5 mg total) by mouth 3 (three) times a day      insulin aspart (NovoLOG) 100 units/mL injection Inject 100 Units under the skin As needed. varies       levothyroxine 75 mcg tablet Take 1 tablet by mouth daily      losartan (COZAAR) 25 mg tablet Take 1 tablet by mouth daily      metoprolol tartrate (LOPRESSOR) 50 mg tablet Take 25 mg by mouth every 12 (twelve) hours Sometimes bp gets low. Below diastolic 60 hold off  25mg in the PM and 12.5 in the AM.      Multiple Vitamin (MULTI-DAY) TABS Take 1 tablet by mouth daily      mycophenolate (CELLCEPT) 250 mg capsule Take 2 capsules by mouth 2 (two) times a day      predniSONE 5 mg tablet Take 1 tablet by mouth daily      rosuvastatin (CRESTOR) 10 MG tablet TAKE 1 TABLET BY MOUTH EVERY DAY 90  tablet 1    tacrolimus (PROGRAF) 1 mg capsule 1 mg in AM in 1 mg in PM       No current facility-administered medications for this visit.         Health Maintenance     Health Maintenance   Topic Date Due    Hepatitis C Screening  Never done    HIV Screening  Never done    Zoster Vaccine (1 of 2) Never done    Pneumococcal Vaccine: Pediatrics (0 to 5 Years) and At-Risk Patients (6 to 64 Years) (1 of 2 - PCV) Never done    RSV Vaccine for Pregnant Patients and Patients Age 60+ Years (1 - Risk 60-74 years 1-dose series) Never done    Colorectal Cancer Screening  01/23/2023    Depression Screening  04/12/2024    Annual Physical  04/12/2024    Diabetic Foot Exam  04/12/2024    Influenza Vaccine (1) 09/01/2024    COVID-19 Vaccine (6 - 2024-25 season) 09/01/2024    Diabetic Eye Exam  04/29/2025    HEMOGLOBIN A1C  08/15/2025    Kidney Health Evaluation: Albumin/Creatinine Ratio  02/15/2026    Kidney Health Evaluation: GFR  04/04/2026    DTaP,Tdap,and Td Vaccines (2 - Td or Tdap) 08/15/2027    Meningococcal B Vaccine  Aged Out    RSV Vaccine age 0-20 Months  Aged Out    HIB Vaccine  Aged Out    IPV Vaccine  Aged Out    Hepatitis A Vaccine  Aged Out    Meningococcal ACWY Vaccine  Aged Out    HPV Vaccine  Aged Out     Immunization History   Administered Date(s) Administered    COVID-19 PFIZER VACCINE 0.3 ML IM 04/05/2021, 04/26/2021, 08/21/2021    COVID-19 Pfizer mRNA vacc PF kevan-sucrose 12 yr and older (Comirnaty) 01/26/2024    COVID-19 Pfizer vac (Kevan-sucrose, gray cap) 12 yr+ IM 05/01/2022    INFLUENZA 10/09/2013, 10/14/2015, 10/12/2016, 10/17/2018, 09/29/2019, 10/22/2021, 11/14/2021    Influenza Injectable, MDCK, Preservative Free, Quadrivalent, 0.5 mL 09/27/2019    Influenza Split Preservative Free ID 10/07/2014    Influenza, injectable, quadrivalent, preservative free 0.5 mL 10/03/2020    Influenza, seasonal, injectable 10/14/2015, 10/12/2016, 10/18/2017    Influenza, seasonal, injectable, preservative free 10/07/2014     Tdap 08/15/2017       Brock Ames MD    I spent 30 minutes with this patient of which greater than 50% was spent counseling or reviewing chart

## 2025-04-08 NOTE — ASSESSMENT & PLAN NOTE
Diabetes patient with type 1 diabetes and uses an insulin pump to try and regulate his blood sugars.  He will continue to see his endocrinologist for management of chronic condition  Lab Results   Component Value Date    HGBA1C 6.0 (H) 02/15/2025

## 2025-04-08 NOTE — ASSESSMENT & PLAN NOTE
Diabetic retinopathy.  Patient continues to see has retinal specialist for his history of diabetic retinopathy that required photocoagulation.  Patient has total blindness of left eye.  Lab Results   Component Value Date    HGBA1C 6.0 (H) 02/15/2025

## 2025-04-09 ENCOUNTER — HOSPITAL ENCOUNTER (OUTPATIENT)
Dept: RADIOLOGY | Facility: HOSPITAL | Age: 65
Discharge: HOME | End: 2025-04-09
Payer: COMMERCIAL

## 2025-04-09 DIAGNOSIS — M79.641 BILATERAL HAND PAIN: ICD-10-CM

## 2025-04-09 DIAGNOSIS — M79.642 BILATERAL HAND PAIN: ICD-10-CM

## 2025-04-09 PROCEDURE — 73130 X-RAY EXAM OF HAND: CPT

## 2025-04-12 ENCOUNTER — APPOINTMENT (OUTPATIENT)
Dept: LAB | Facility: CLINIC | Age: 65
End: 2025-04-12
Payer: COMMERCIAL

## 2025-04-12 DIAGNOSIS — I10 HYPERTENSION, UNSPECIFIED TYPE: ICD-10-CM

## 2025-04-12 DIAGNOSIS — I10 PRIMARY HYPERTENSION: ICD-10-CM

## 2025-04-12 DIAGNOSIS — Z94.0 RENAL TRANSPLANT RECIPIENT: ICD-10-CM

## 2025-04-12 DIAGNOSIS — Z94.0 KIDNEY REPLACED BY TRANSPLANT: ICD-10-CM

## 2025-04-12 LAB
ALBUMIN SERPL BCG-MCNC: 4.1 G/DL (ref 3.5–5)
ALP SERPL-CCNC: 66 U/L (ref 34–104)
ALT SERPL W P-5'-P-CCNC: 26 U/L (ref 7–52)
ANION GAP SERPL CALCULATED.3IONS-SCNC: 9 MMOL/L (ref 4–13)
AST SERPL W P-5'-P-CCNC: 23 U/L (ref 13–39)
BASOPHILS # BLD AUTO: 0.06 THOUSANDS/ÂΜL (ref 0–0.1)
BASOPHILS NFR BLD AUTO: 1 % (ref 0–1)
BILIRUB SERPL-MCNC: 0.83 MG/DL (ref 0.2–1)
BUN SERPL-MCNC: 19 MG/DL (ref 5–25)
CALCIUM SERPL-MCNC: 9.1 MG/DL (ref 8.4–10.2)
CHLORIDE SERPL-SCNC: 104 MMOL/L (ref 96–108)
CHOLEST SERPL-MCNC: 158 MG/DL (ref ?–200)
CO2 SERPL-SCNC: 27 MMOL/L (ref 21–32)
CREAT SERPL-MCNC: 1 MG/DL (ref 0.6–1.3)
EOSINOPHIL # BLD AUTO: 0.17 THOUSAND/ÂΜL (ref 0–0.61)
EOSINOPHIL NFR BLD AUTO: 2 % (ref 0–6)
ERYTHROCYTE [DISTWIDTH] IN BLOOD BY AUTOMATED COUNT: 13.4 % (ref 11.6–15.1)
GFR SERPL CREATININE-BSD FRML MDRD: 79 ML/MIN/1.73SQ M
GLUCOSE P FAST SERPL-MCNC: 101 MG/DL (ref 65–99)
HCT VFR BLD AUTO: 47.2 % (ref 36.5–49.3)
HDLC SERPL-MCNC: 86 MG/DL
HGB BLD-MCNC: 14.9 G/DL (ref 12–17)
IMM GRANULOCYTES # BLD AUTO: 0.02 THOUSAND/UL (ref 0–0.2)
IMM GRANULOCYTES NFR BLD AUTO: 0 % (ref 0–2)
LDLC SERPL CALC-MCNC: 59 MG/DL (ref 0–100)
LYMPHOCYTES # BLD AUTO: 1.78 THOUSANDS/ÂΜL (ref 0.6–4.47)
LYMPHOCYTES NFR BLD AUTO: 26 % (ref 14–44)
MCH RBC QN AUTO: 30.7 PG (ref 26.8–34.3)
MCHC RBC AUTO-ENTMCNC: 31.6 G/DL (ref 31.4–37.4)
MCV RBC AUTO: 97 FL (ref 82–98)
MONOCYTES # BLD AUTO: 0.81 THOUSAND/ÂΜL (ref 0.17–1.22)
MONOCYTES NFR BLD AUTO: 12 % (ref 4–12)
NEUTROPHILS # BLD AUTO: 4.11 THOUSANDS/ÂΜL (ref 1.85–7.62)
NEUTS SEG NFR BLD AUTO: 59 % (ref 43–75)
NRBC BLD AUTO-RTO: 0 /100 WBCS
PLATELET # BLD AUTO: 266 THOUSANDS/UL (ref 149–390)
PMV BLD AUTO: 9.7 FL (ref 8.9–12.7)
POTASSIUM SERPL-SCNC: 4.1 MMOL/L (ref 3.5–5.3)
PROT SERPL-MCNC: 6.9 G/DL (ref 6.4–8.4)
RBC # BLD AUTO: 4.86 MILLION/UL (ref 3.88–5.62)
SODIUM SERPL-SCNC: 140 MMOL/L (ref 135–147)
TRIGL SERPL-MCNC: 63 MG/DL (ref ?–150)
WBC # BLD AUTO: 6.95 THOUSAND/UL (ref 4.31–10.16)

## 2025-04-12 PROCEDURE — 36415 COLL VENOUS BLD VENIPUNCTURE: CPT

## 2025-04-12 PROCEDURE — 85025 COMPLETE CBC W/AUTO DIFF WBC: CPT

## 2025-04-12 PROCEDURE — 80197 ASSAY OF TACROLIMUS: CPT

## 2025-04-12 PROCEDURE — 80053 COMPREHEN METABOLIC PANEL: CPT

## 2025-04-12 PROCEDURE — 80061 LIPID PANEL: CPT

## 2025-04-13 LAB — TACROLIMUS BLD-MCNC: 4.7 NG/ML (ref 3–15)

## 2025-04-14 ENCOUNTER — RESULTS FOLLOW-UP (OUTPATIENT)
Dept: CARDIOLOGY CLINIC | Facility: MEDICAL CENTER | Age: 65
End: 2025-04-14

## 2025-04-15 ENCOUNTER — RESULTS FOLLOW-UP (OUTPATIENT)
Dept: FAMILY MEDICINE CLINIC | Facility: CLINIC | Age: 65
End: 2025-04-15

## 2025-04-15 NOTE — TELEPHONE ENCOUNTER
Spoke to pt regarding the following message      Talon Castro, DO   Pleaset let patient know that tacrolimus is better at 4.7 and cmp stable. No changes repeat monthly labs. He should have slips but if he needs again let me know     Pt verbalized understanding.

## 2025-04-15 NOTE — RESULT ENCOUNTER NOTE
Pleaset let patient know that tacrolimus is better at 4.7 and cmp stable. No changes repeat monthly labs. He should have slips but if he needs again let me know

## 2025-04-30 DIAGNOSIS — E08.00 DIABETES MELLITUS DUE TO UNDERLYING CONDITION WITH HYPEROSMOLARITY WITHOUT COMA, WITHOUT LONG-TERM CURRENT USE OF INSULIN (HCC): ICD-10-CM

## 2025-04-30 DIAGNOSIS — I10 HYPERTENSION, UNSPECIFIED TYPE: ICD-10-CM

## 2025-04-30 RX ORDER — ROSUVASTATIN CALCIUM 10 MG/1
10 TABLET, COATED ORAL DAILY
Qty: 90 TABLET | Refills: 1 | Status: SHIPPED | OUTPATIENT
Start: 2025-04-30

## 2025-06-28 ENCOUNTER — APPOINTMENT (OUTPATIENT)
Dept: LAB | Facility: CLINIC | Age: 65
End: 2025-06-28
Payer: COMMERCIAL

## 2025-06-28 DIAGNOSIS — Z94.0 RENAL TRANSPLANT RECIPIENT: ICD-10-CM

## 2025-06-28 LAB
BACTERIA UR QL AUTO: NORMAL /HPF
BILIRUB UR QL STRIP: NEGATIVE
CLARITY UR: CLEAR
COLOR UR: NORMAL
CREAT UR-MCNC: 123.1 MG/DL
GLUCOSE UR STRIP-MCNC: NEGATIVE MG/DL
HGB UR QL STRIP.AUTO: NEGATIVE
KETONES UR STRIP-MCNC: NEGATIVE MG/DL
LEUKOCYTE ESTERASE UR QL STRIP: NEGATIVE
MAGNESIUM SERPL-MCNC: 1.9 MG/DL (ref 1.9–2.7)
MICROALBUMIN UR-MCNC: 7.5 MG/L
MICROALBUMIN/CREAT 24H UR: 6 MG/G CREATININE (ref 0–30)
NITRITE UR QL STRIP: NEGATIVE
NON-SQ EPI CELLS URNS QL MICRO: NORMAL /HPF
PH UR STRIP.AUTO: 6 [PH]
PHOSPHATE SERPL-MCNC: 2.8 MG/DL (ref 2.3–4.1)
PROT UR STRIP-MCNC: NEGATIVE MG/DL
RBC #/AREA URNS AUTO: NORMAL /HPF
SP GR UR STRIP.AUTO: 1.02 (ref 1–1.03)
UROBILINOGEN UR STRIP-ACNC: <2 MG/DL
WBC #/AREA URNS AUTO: NORMAL /HPF

## 2025-06-28 PROCEDURE — 81001 URINALYSIS AUTO W/SCOPE: CPT

## 2025-06-28 PROCEDURE — 80197 ASSAY OF TACROLIMUS: CPT

## 2025-06-28 PROCEDURE — 82043 UR ALBUMIN QUANTITATIVE: CPT

## 2025-06-28 PROCEDURE — 83735 ASSAY OF MAGNESIUM: CPT

## 2025-06-28 PROCEDURE — 84100 ASSAY OF PHOSPHORUS: CPT

## 2025-06-28 PROCEDURE — 82570 ASSAY OF URINE CREATININE: CPT

## 2025-06-28 PROCEDURE — 36415 COLL VENOUS BLD VENIPUNCTURE: CPT

## 2025-06-29 LAB — TACROLIMUS BLD-MCNC: 4.7 NG/ML (ref 3–15)

## 2025-07-02 ENCOUNTER — TRANSCRIBE ORDERS (OUTPATIENT)
Dept: LAB | Facility: CLINIC | Age: 65
End: 2025-07-02

## 2025-07-02 ENCOUNTER — APPOINTMENT (OUTPATIENT)
Dept: LAB | Facility: CLINIC | Age: 65
End: 2025-07-02
Payer: COMMERCIAL

## 2025-07-02 DIAGNOSIS — I10 PRIMARY HYPERTENSION: ICD-10-CM

## 2025-07-02 DIAGNOSIS — I51.9 MYXEDEMA HEART DISEASE: ICD-10-CM

## 2025-07-02 DIAGNOSIS — E03.9 MYXEDEMA HEART DISEASE: ICD-10-CM

## 2025-07-02 DIAGNOSIS — E10.22 CONTROLLED TYPE 1 DIABETES MELLITUS WITH CHRONIC KIDNEY DISEASE, UNSPECIFIED CKD STAGE (HCC): ICD-10-CM

## 2025-07-02 DIAGNOSIS — E10.638 TYPE 1 DIABETES MELLITUS WITH OTHER ORAL COMPLICATION (HCC): ICD-10-CM

## 2025-07-02 DIAGNOSIS — E10.638 TYPE 1 DIABETES MELLITUS WITH OTHER ORAL COMPLICATION (HCC): Primary | ICD-10-CM

## 2025-07-02 DIAGNOSIS — Z94.0 RENAL TRANSPLANT RECIPIENT: ICD-10-CM

## 2025-07-02 DIAGNOSIS — I10 HYPERTENSION, UNSPECIFIED TYPE: ICD-10-CM

## 2025-07-02 LAB
ALBUMIN SERPL BCG-MCNC: 3.9 G/DL (ref 3.5–5)
ALP SERPL-CCNC: 67 U/L (ref 34–104)
ALT SERPL W P-5'-P-CCNC: 22 U/L (ref 7–52)
ANION GAP SERPL CALCULATED.3IONS-SCNC: 8 MMOL/L (ref 4–13)
AST SERPL W P-5'-P-CCNC: 22 U/L (ref 13–39)
BASOPHILS # BLD AUTO: 0.05 THOUSANDS/ÂΜL (ref 0–0.1)
BASOPHILS NFR BLD AUTO: 1 % (ref 0–1)
BILIRUB SERPL-MCNC: 1.02 MG/DL (ref 0.2–1)
BUN SERPL-MCNC: 21 MG/DL (ref 5–25)
CALCIUM SERPL-MCNC: 9.1 MG/DL (ref 8.4–10.2)
CHLORIDE SERPL-SCNC: 106 MMOL/L (ref 96–108)
CO2 SERPL-SCNC: 25 MMOL/L (ref 21–32)
CREAT SERPL-MCNC: 0.99 MG/DL (ref 0.6–1.3)
CREAT UR-MCNC: 57.4 MG/DL
EOSINOPHIL # BLD AUTO: 0.15 THOUSAND/ÂΜL (ref 0–0.61)
EOSINOPHIL NFR BLD AUTO: 2 % (ref 0–6)
ERYTHROCYTE [DISTWIDTH] IN BLOOD BY AUTOMATED COUNT: 13 % (ref 11.6–15.1)
EST. AVERAGE GLUCOSE BLD GHB EST-MCNC: 128 MG/DL
GFR SERPL CREATININE-BSD FRML MDRD: 79 ML/MIN/1.73SQ M
GLUCOSE P FAST SERPL-MCNC: 106 MG/DL (ref 65–99)
HBA1C MFR BLD: 6.1 %
HCT VFR BLD AUTO: 45.7 % (ref 36.5–49.3)
HGB BLD-MCNC: 15.1 G/DL (ref 12–17)
IMM GRANULOCYTES # BLD AUTO: 0.02 THOUSAND/UL (ref 0–0.2)
IMM GRANULOCYTES NFR BLD AUTO: 0 % (ref 0–2)
LYMPHOCYTES # BLD AUTO: 1.73 THOUSANDS/ÂΜL (ref 0.6–4.47)
LYMPHOCYTES NFR BLD AUTO: 26 % (ref 14–44)
MAGNESIUM SERPL-MCNC: 1.8 MG/DL (ref 1.9–2.7)
MCH RBC QN AUTO: 31.5 PG (ref 26.8–34.3)
MCHC RBC AUTO-ENTMCNC: 33 G/DL (ref 31.4–37.4)
MCV RBC AUTO: 95 FL (ref 82–98)
MICROALBUMIN UR-MCNC: <7 MG/L
MONOCYTES # BLD AUTO: 0.78 THOUSAND/ÂΜL (ref 0.17–1.22)
MONOCYTES NFR BLD AUTO: 12 % (ref 4–12)
NEUTROPHILS # BLD AUTO: 3.89 THOUSANDS/ÂΜL (ref 1.85–7.62)
NEUTS SEG NFR BLD AUTO: 59 % (ref 43–75)
NRBC BLD AUTO-RTO: 0 /100 WBCS
PHOSPHATE SERPL-MCNC: 2.8 MG/DL (ref 2.3–4.1)
PLATELET # BLD AUTO: 264 THOUSANDS/UL (ref 149–390)
PMV BLD AUTO: 9.8 FL (ref 8.9–12.7)
POTASSIUM SERPL-SCNC: 4.6 MMOL/L (ref 3.5–5.3)
PROT SERPL-MCNC: 6.9 G/DL (ref 6.4–8.4)
RBC # BLD AUTO: 4.79 MILLION/UL (ref 3.88–5.62)
SODIUM SERPL-SCNC: 139 MMOL/L (ref 135–147)
T4 FREE SERPL-MCNC: 0.93 NG/DL (ref 0.61–1.12)
TSH SERPL DL<=0.05 MIU/L-ACNC: 4.64 UIU/ML (ref 0.45–4.5)
WBC # BLD AUTO: 6.62 THOUSAND/UL (ref 4.31–10.16)

## 2025-07-02 PROCEDURE — 82570 ASSAY OF URINE CREATININE: CPT

## 2025-07-02 PROCEDURE — 84439 ASSAY OF FREE THYROXINE: CPT

## 2025-07-02 PROCEDURE — 83735 ASSAY OF MAGNESIUM: CPT

## 2025-07-02 PROCEDURE — 85025 COMPLETE CBC W/AUTO DIFF WBC: CPT

## 2025-07-02 PROCEDURE — 82024 ASSAY OF ACTH: CPT

## 2025-07-02 PROCEDURE — 84100 ASSAY OF PHOSPHORUS: CPT

## 2025-07-02 PROCEDURE — 82043 UR ALBUMIN QUANTITATIVE: CPT

## 2025-07-02 PROCEDURE — 80053 COMPREHEN METABOLIC PANEL: CPT

## 2025-07-02 PROCEDURE — 83036 HEMOGLOBIN GLYCOSYLATED A1C: CPT

## 2025-07-02 PROCEDURE — 36415 COLL VENOUS BLD VENIPUNCTURE: CPT

## 2025-07-02 PROCEDURE — 84443 ASSAY THYROID STIM HORMONE: CPT

## 2025-07-02 PROCEDURE — 83835 ASSAY OF METANEPHRINES: CPT

## 2025-07-03 LAB — ACTH PLAS-MCNC: 22.8 PG/ML (ref 7.2–63.3)

## 2025-07-07 LAB
METANEPH FREE SERPL-MCNC: 63.2 PG/ML (ref 0–88)
NORMETANEPHRINE SERPL-MCNC: 65.7 PG/ML (ref 0–285.2)

## 2025-07-21 ENCOUNTER — TELEPHONE (OUTPATIENT)
Dept: NEPHROLOGY | Facility: CLINIC | Age: 65
End: 2025-07-21

## 2025-07-29 ENCOUNTER — OFFICE VISIT (OUTPATIENT)
Dept: NEPHROLOGY | Facility: CLINIC | Age: 65
End: 2025-07-29
Payer: COMMERCIAL

## 2025-07-29 VITALS
BODY MASS INDEX: 29.26 KG/M2 | SYSTOLIC BLOOD PRESSURE: 132 MMHG | DIASTOLIC BLOOD PRESSURE: 72 MMHG | HEART RATE: 56 BPM | WEIGHT: 209 LBS | HEIGHT: 71 IN

## 2025-07-29 DIAGNOSIS — D84.9 IMMUNOSUPPRESSION (HCC): ICD-10-CM

## 2025-07-29 DIAGNOSIS — E08.00 DIABETES MELLITUS DUE TO UNDERLYING CONDITION WITH HYPEROSMOLARITY WITHOUT COMA, WITHOUT LONG-TERM CURRENT USE OF INSULIN (HCC): ICD-10-CM

## 2025-07-29 DIAGNOSIS — I10 PRIMARY HYPERTENSION: ICD-10-CM

## 2025-07-29 DIAGNOSIS — I10 HYPERTENSION, UNSPECIFIED TYPE: ICD-10-CM

## 2025-07-29 DIAGNOSIS — E83.42 HYPOMAGNESEMIA: ICD-10-CM

## 2025-07-29 DIAGNOSIS — M10.00 IDIOPATHIC GOUT, UNSPECIFIED CHRONICITY, UNSPECIFIED SITE: ICD-10-CM

## 2025-07-29 DIAGNOSIS — Z94.0 RENAL TRANSPLANT RECIPIENT: Primary | ICD-10-CM

## 2025-07-29 PROCEDURE — G2211 COMPLEX E/M VISIT ADD ON: HCPCS | Performed by: INTERNAL MEDICINE

## 2025-07-29 PROCEDURE — 99214 OFFICE O/P EST MOD 30 MIN: CPT | Performed by: INTERNAL MEDICINE

## 2025-07-29 RX ORDER — HYDRALAZINE HYDROCHLORIDE 25 MG/1
25 TABLET, FILM COATED ORAL 3 TIMES DAILY
Start: 2025-07-29

## 2025-07-29 RX ORDER — TACROLIMUS 0.5 MG/1
0.5 CAPSULE ORAL EVERY EVENING
Qty: 90 CAPSULE | Refills: 3 | Status: SHIPPED | OUTPATIENT
Start: 2025-07-29

## 2025-07-29 RX ORDER — TACROLIMUS 0.5 MG/1
1 CAPSULE ORAL EVERY MORNING
Qty: 180 CAPSULE | Refills: 3 | Status: SHIPPED | OUTPATIENT
Start: 2025-07-29

## 2025-07-29 RX ORDER — MAGNESIUM OXIDE 400 MG/1
400 TABLET ORAL DAILY
Qty: 90 TABLET | Refills: 3 | Status: SHIPPED | OUTPATIENT
Start: 2025-07-29 | End: 2025-07-29

## 2025-07-29 RX ORDER — MAGNESIUM OXIDE 400 MG/1
400 TABLET ORAL DAILY
Start: 2025-07-29

## 2025-07-29 RX ORDER — TACROLIMUS 0.5 MG/1
CAPSULE ORAL
Status: SHIPPED
Start: 2025-07-29 | End: 2025-07-29